# Patient Record
Sex: FEMALE | Race: WHITE | NOT HISPANIC OR LATINO | Employment: UNEMPLOYED | URBAN - METROPOLITAN AREA
[De-identification: names, ages, dates, MRNs, and addresses within clinical notes are randomized per-mention and may not be internally consistent; named-entity substitution may affect disease eponyms.]

---

## 2017-07-29 ENCOUNTER — HOSPITAL ENCOUNTER (EMERGENCY)
Facility: CLINIC | Age: 32
Discharge: HOME | End: 2017-07-29
Attending: EMERGENCY MEDICINE | Admitting: EMERGENCY MEDICINE
Payer: COMMERCIAL

## 2017-07-29 VITALS
TEMPERATURE: 98.5 F | SYSTOLIC BLOOD PRESSURE: 129 MMHG | BODY MASS INDEX: 20.8 KG/M2 | RESPIRATION RATE: 18 BRPM | HEART RATE: 75 BPM | OXYGEN SATURATION: 100 % | DIASTOLIC BLOOD PRESSURE: 83 MMHG | WEIGHT: 125 LBS

## 2017-07-29 DIAGNOSIS — J06.9 UPPER RESPIRATORY TRACT INFECTION, UNSPECIFIED TYPE: ICD-10-CM

## 2017-07-29 DIAGNOSIS — J45.909 UNCOMPLICATED ASTHMA, UNSPECIFIED ASTHMA SEVERITY: ICD-10-CM

## 2017-07-29 LAB — D DIMER PPP FEU-MCNC: 0.3 UG/ML FEU (ref 0–0.5)

## 2017-07-29 PROCEDURE — 99283 EMERGENCY DEPT VISIT LOW MDM: CPT

## 2017-07-29 PROCEDURE — 85379 FIBRIN DEGRADATION QUANT: CPT | Performed by: EMERGENCY MEDICINE

## 2017-07-29 ASSESSMENT — ENCOUNTER SYMPTOMS
SHORTNESS OF BREATH: 1
COUGH: 1

## 2017-07-29 NOTE — DISCHARGE INSTRUCTIONS
Discharge Instructions  Upper Respiratory Infection    The upper respiratory tract includes the sinuses, nasal passages, pharynx, and larynx. A URI, or upper respiratory infection, is an infection of any of the parts of the upper airway. Symptoms include runny nose, congestion, sore throat, cough, and fever. URIs are almost always caused by a virus. Antibiotics do not help with virus infections, so are not used for an ordinary URI. A URI is very contagious through coughing and nasal secretions; make sure you wash your hands often and clean surfaces after sneezing, coughing or touching them.  Viruses can live on surfaces for up to 3 days.      Return to the Emergency Department if:    Any of the symptoms you have get much worse.    You seem very sick, like being too weak to get up.    You have any new symptoms, especially serious things like chest pain.     You are short of breath.     You have a severe headache.    You are vomiting so much you can t keep fluids or medicines down.    You have confusion or seem unusually drowsy.    You have a seizure or convulsion.    Follow-up:      You should start to improve in 3 - 5 days.  A cough can linger for up to six weeks, but overall you should be feeling much better.  See your doctor if you have a fever for more than 3 days, or if you are not feeling better within 5 days.      What can I do to help myself?    Fill any prescriptions the doctor gave you and take them right away    If you have a fever, get plenty of rest and drink lots of fluids, especially water. Using a humidifier or saline nose spray will also help loosen secretions.     What clothes or blankets you have on won t change your fever. Do what is comfortable for you.    Bathing or sponging in lukewarm water may help you feel better.    Tylenol  (acetaminophen), Motrin  (ibuprofen), or Advil  (ibuprofen) help bring fever down and may help you feel more comfortable. Be sure to read and follow the package  directions, and ask your doctor if you have questions.    Do not drink alcohol.    Decongestants may help you feel better. You may use decongestant nose sprays Afrin  (oxymetazoline) or Erickson-Synephrine  (phenylephrine hydrochloride) for up to 3 days, or may use a decongestant tablet like Sudafed  (pseudoephedrine).  If you were given a prescription for medicine here today, be sure to read all of the information (including the package insert) that comes with your prescription.  This will include important information about the medicine, its side effects, and any warnings that you need to know about.  The pharmacist who fills the prescription can provide more information and answer questions you may have about the medicine.  If you have questions or concerns that the pharmacist cannot address, please call or return to the Emergency Department.   Opioid Medication Information    Pain medications are among the most commonly prescribed medicines, so we are including this information for all our patients. If you did not receive pain medication or get a prescription for pain medicine, you can ignore it.     You may have been given a prescription for an opioid (narcotic) pain medicine and/or have received a pain medicine while here in the Emergency Department. These medicines can make you drowsy or impaired. You must not drive, operate dangerous equipment, or engage in any other dangerous activities while taking these medications. If you drive while taking these medications, you could be arrested for DUI, or driving under the influence. Do not drink any alcohol while you are taking these medications.     Opioid pain medications can cause addiction. If you have a history of chemical dependency of any type, you are at a higher risk of becoming addicted to pain medications.  Only take these prescribed medications to treat your pain when all other options have been tried. Take it for as short a time and as few doses as possible.  Store your pain pills in a secure place, as they are frequently stolen and provide a dangerous opportunity for children or visitors in your house to start abusing these powerful medications. We will not replace any lost or stolen medicine.  As soon as your pain is better, you should flush all your remaining medication.     Many prescription pain medications contain Tylenol  (acetaminophen), including Vicodin , Tylenol #3 , Norco , Lortab , and Percocet .  You should not take any extra pills of Tylenol  if you are using these prescription medications or you can get very sick.  Do not ever take more than 3000 mg of acetaminophen in any 24 hour period.    All opioids tend to cause constipation. Drink plenty of water and eat foods that have a lot of fiber, such as fruits, vegetables, prune juice, apple juice and high fiber cereal.  Take a laxative if you don t move your bowels at least every other day. Miralax , Milk of Magnesia, Colace , or Senna  can be used to keep you regular.      Remember that you can always come back to the Emergency Department if you are not able to see your regular doctor in the amount of time listed above, if you get any new symptoms, or if there is anything that worries you.        Discharge Instructions  Asthma    Asthma is a condition causing narrowing and inflammation of the airways that can make it hard to breathe.  Asthma can also cause cough, wheezing, noisy breathing and tightness in the chest.  Asthma can be brought on or  triggered  by many things, including dust, mold, pollen, cigarette smoke, exercise, stress and infections, like the common cold.     Return to the Emergency Department if:    Your breathing gets worse.    You need to use your inhaler more often than every 4 hours, or can t get relief from your inhaler.    You are very weak, or feel much more ill.    You develop new symptoms, such as chest pain.    You cough up blood.    You are vomiting enough that you can t keep  fluids or your medicine down.    What can I do to help myself?    Fill any prescriptions the doctor gave you and take them right away--especially antibiotics. Be sure to finish the whole antibiotic prescription.    You may be given a prescription for an inhaler, which can help loosen tight air passages.  Use this as needed, but not more often than directed. Inhalers work much better when used with a spacer.     You may be given a prescription for a steroid to reduce inflammation. Used long-term, these can have many serious side effects, but for short courses these do not happen. You may notice restlessness or increased appetite.        You may use non-prescription cough or cold medicines. Cough medicines may help, but don t make the cough go away completely.     Avoid smoke, because this can make your symptoms worse. If you smoke, this may be a good time to quit! Consider using nicotine lozenges, gum, or patches to reduce cravings.     If you have a fever, Tylenol  (acetaminophen), Motrin  (ibuprofen), or Advil  (ibuprofen), may help bring fever down and may help you feel more comfortable. Be sure to read and follow the package directions, and ask your doctor if you have questions.    Be sure to get your flu shot each year.  The pneumonia shot can help prevent pneumonia.  It is important that you follow up with your regular doctor, to be sure that you are improving from this spell (an acute asthma exacerbation), and also to do what you can to keep from having trouble again. Sometimes you need long-term medicines to keep your asthma under control.   If you were given a prescription for medicine here today, be sure to read all of the information (including the package insert) that comes with your prescription.  This will include important information about the medicine, its side effects, and any warnings that you need to know about.  The pharmacist who fills the prescription can provide more information and answer  questions you may have about the medicine.  If you have questions or concerns that the pharmacist cannot address, please call or return to the Emergency Department.   Opioid Medication Information    Pain medications are among the most commonly prescribed medicines, so we are including this information for all our patients. If you did not receive pain medication or get a prescription for pain medicine, you can ignore it.     You may have been given a prescription for an opioid (narcotic) pain medicine and/or have received a pain medicine while here in the Emergency Department. These medicines can make you drowsy or impaired. You must not drive, operate dangerous equipment, or engage in any other dangerous activities while taking these medications. If you drive while taking these medications, you could be arrested for DUI, or driving under the influence. Do not drink any alcohol while you are taking these medications.     Opioid pain medications can cause addiction. If you have a history of chemical dependency of any type, you are at a higher risk of becoming addicted to pain medications.  Only take these prescribed medications to treat your pain when all other options have been tried. Take it for as short a time and as few doses as possible. Store your pain pills in a secure place, as they are frequently stolen and provide a dangerous opportunity for children or visitors in your house to start abusing these powerful medications. We will not replace any lost or stolen medicine.  As soon as your pain is better, you should flush all your remaining medication.     Many prescription pain medications contain Tylenol  (acetaminophen), including Vicodin , Tylenol #3 , Norco , Lortab , and Percocet .  You should not take any extra pills of Tylenol  if you are using these prescription medications or you can get very sick.  Do not ever take more than 3000 mg of acetaminophen in any 24 hour period.    All opioids tend to cause  constipation. Drink plenty of water and eat foods that have a lot of fiber, such as fruits, vegetables, prune juice, apple juice and high fiber cereal.  Take a laxative if you don t move your bowels at least every other day. Miralax , Milk of Magnesia, Colace , or Senna  can be used to keep you regular.      Remember that you can always come back to the Emergency Department if you are not able to see your regular doctor in the amount of time listed above, if you get any new symptoms, or if there is anything that worries you.

## 2017-07-29 NOTE — ED PROVIDER NOTES
History     Chief Complaint:  Cough and shortness of breath    HPI   Lorri Ansari is a 32 year old female with a history of asthma who presents from urgent care with cough and shortness of breath. The patient reports over a week ago she developed a cough which has been fairly persistent. She has been using her inhaler and started using Prednisone, 20 mg BID, on Tuesday which seemed to improve her symptoms and is currently on her 5th day. Yesterday the patient's cough returned and has been progressively worsening. She continues to use albuterol which offers brief improvement of her cough. The patient was seen at urgent care and advised to visit the emergency department for d-dimer given her risk factors noted below. The patient denies leg swelling/pain. No pleuritic component to her pain or reports of chest pain. She has had waxing and waning shortness of breath since onset of her cough. Patient received nebulizer at urgent care prior to arrival with some improvement of her symptoms.      PE/DVT Risk Factors:  The patient denies a personal history of PE, DVT, or other clotting disorders but endorses family history of stroke. The patient denies tobacco use. Currently on birth control. Patient recently had long car ride.    Allergies:  Dilaudid     Medications:    Fexofenadine hcl (allegra po)   Multivitamin, therapeutic with minerals (multi-vitamin) tabs   Hydrocodone-acetaminophen (norco) 5-325 mg per tablet   Albuterol (2.5 mg/3ml) 0.083% nebulizer solution   Fluticasone (flonase) 50 mcg/act nasal spray   Mometasone-formoterol (dulera) 200-5 mcg/act oral inhaler   Norgestrel-ethinyl estradiol (lo/ovral) 0.3-30 mg-mcg per tablet   Polyethylene glycol (miralax/glycolax) powder   Albuterol sulfate (ventolin hfa) 108 (90 base) mcg/act aers   Cetirizine (zyrtec) 10 mg tablet   Sertraline hcl 100 mg or tabs     Past Medical History:    Allergic rhinitis due to pollen   Asthma   Chronic pansinusitis   Constipation    Depression   Deviated nasal septum   Endometriosis   History of IBS       Past Surgical History:    Abdominoplasty     Septoplasty    Family History:    Depression  Thyroid disease    Social History:  .  The patient denies smoking.   The patient consumes alcohol, occasional beer.     Review of Systems   Respiratory: Positive for cough and shortness of breath.    Cardiovascular: Negative for chest pain.   All other systems reviewed and are negative.    Physical Exam     Patient Vitals for the past 24 hrs:   BP Temp Temp src Pulse Resp SpO2 Weight   17 1300 129/83 - - - 18 100 % -   17 1245 128/78 - - - 18 100 % -   17 1230 (!) 141/97 98.5  F (36.9  C) Oral 75 16 96 % 56.7 kg (125 lb)          Physical Exam  General: Resting on the gurney, appears comfortable. Frequent cough.  Head:  The scalp, face, and head appear normal  Mouth/Throat: Mucus membranes are moist  CV:  Regular rate    Normal S1 and S2  No pathological murmur   Resp:  Breath sounds clear and equal bilaterally    Non-labored, no retractions or accessory muscle use    No coarseness    No wheezing   GI:  Abdomen is soft, no rigidity    No tenderness to palpation  MS:  Normal motor assessment of all extremities.    Good capillary refill noted.  Skin:  No rash or lesions noted.  Neuro:   Speech is normal and fluent. No apparent deficit.  Psych: Awake. Alert.  Normal affect.      Appropriate interactions.      Emergency Department Course   Emergency Department Course:  Nursing notes and vitals reviewed.  I performed an exam of the patient as documented above.     The work up above was undertaken.     Findings and plan explained to the Patient. Patient discharged home with instructions regarding supportive care, medications, and reasons to return. The importance of close follow-up was reviewed.     Impression & Plan    Medical Decision Making:  Lorri Ansari is a 32 year old female who presents to the emergency department  complaining of chest pain. The patient is finishing up a steroid burst for asthma and has had ongoing cough. She went to urgent care and had a negative chest x-ray. Urgent care sent her here as they were unable to obtain d-dimer at their facility. The patient did have a recent long car ride and is on birth control and I believe screening with d-dimer is reasonable. Her d-dimer was negative. As patient had an x-ray, is already on prednisone, and already had neb today, I do not feel the patient needs further intervention in the emergency department. She is stable and in no respiratory distress. She is eager for discharge to home and I am comfortable discharging her. Follow up as an outpatient in 2-3 days. Return to the emergency department for new or worsening symptoms.    Diagnosis:    ICD-10-CM    1. Upper respiratory tract infection, unspecified type J06.9    2. Uncomplicated asthma, unspecified asthma severity J45.909        Disposition:  Discharged in good condition.    I, Chemo Lara, am serving as a scribe at 1:38 PM on 7/29/2017 to document services personally performed by Dr. Michel, based on my observations and the provider's statements to me.     EMERGENCY DEPARTMENT       Macey Michel MD  07/29/17 1549

## 2017-07-29 NOTE — ED AVS SNAPSHOT
Emergency Department    [address] [place] [id number]    Phone:  [phone]    Fax:  [phone]                                       Lorri Ansari   MRN: [id number]    Department:   Emergency Department   Date of Visit:  7/29/2017           After Visit Summary Signature Page     I have received my discharge instructions, and my questions have been answered. I have discussed any challenges I see with this plan with the nurse or doctor.    ..........................................................................................................................................  Patient/Patient Representative Signature      ..........................................................................................................................................  Patient Representative Print Name and Relationship to Patient    ..................................................               ................................................  Date                                            Time    ..........................................................................................................................................  Reviewed by Signature/Title    ...................................................              ..............................................  Date                                                            Time

## 2017-07-29 NOTE — ED AVS SNAPSHOT
Emergency Department    [address] [facility] [address]    Phone:  [phone]    Fax:  [phone]                                       Lorri Ansari   MRN: [id number]    Department:   Emergency Department   Date of Visit:  7/29/2017           Patient Information     Date Of Birth          1985        Your diagnoses for this visit were:     Upper respiratory tract infection, unspecified type     Uncomplicated asthma, unspecified asthma severity        You were seen by Macey Michel MD.      Follow-up Information     Follow up with [facility] [address] [facility]. Schedule an appointment as soon as possible for a visit in 2 days.    Contact information:    [address]  [facility] [address]          Follow up with  Emergency Department.    Specialty:  EMERGENCY MEDICINE    Why:  As needed, If symptoms worsen    Contact information:    [address] [facility] [id number] [phone]        Discharge Instructions       Discharge Instructions  Upper Respiratory Infection    The upper respiratory tract includes the sinuses, nasal passages, pharynx, and larynx. A URI, or upper respiratory infection, is an infection of any of the parts of the upper airway. Symptoms include runny nose, congestion, sore throat, cough, and fever. URIs are almost always caused by a virus. Antibiotics do not help with virus infections, so are not used for an ordinary URI. A URI is very contagious through coughing and nasal secretions; make sure you wash your hands often and clean surfaces after sneezing, coughing or touching them.  Viruses can live on surfaces for up to 3 days.      Return to the Emergency Department if:    Any of the symptoms you have get much worse.    You seem very sick, like being too weak to get up.    You have any new symptoms, especially serious things like chest pain.     You are short of breath.     You have a severe headache.    You are vomiting so much you can t  keep fluids or medicines down.    You have confusion or seem unusually drowsy.    You have a seizure or convulsion.    Follow-up:      You should start to improve in 3 - 5 days.  A cough can linger for up to six weeks, but overall you should be feeling much better.  See your doctor if you have a fever for more than 3 days, or if you are not feeling better within 5 days.      What can I do to help myself?    Fill any prescriptions the doctor gave you and take them right away    If you have a fever, get plenty of rest and drink lots of fluids, especially water. Using a humidifier or saline nose spray will also help loosen secretions.     What clothes or blankets you have on won t change your fever. Do what is comfortable for you.    Bathing or sponging in lukewarm water may help you feel better.    Tylenol  (acetaminophen), Motrin  (ibuprofen), or Advil  (ibuprofen) help bring fever down and may help you feel more comfortable. Be sure to read and follow the package directions, and ask your doctor if you have questions.    Do not drink alcohol.    Decongestants may help you feel better. You may use decongestant nose sprays Afrin  (oxymetazoline) or Erickson-Synephrine  (phenylephrine hydrochloride) for up to 3 days, or may use a decongestant tablet like Sudafed  (pseudoephedrine).  If you were given a prescription for medicine here today, be sure to read all of the information (including the package insert) that comes with your prescription.  This will include important information about the medicine, its side effects, and any warnings that you need to know about.  The pharmacist who fills the prescription can provide more information and answer questions you may have about the medicine.  If you have questions or concerns that the pharmacist cannot address, please call or return to the Emergency Department.   Opioid Medication Information    Pain medications are among the most commonly prescribed medicines, so we are  including this information for all our patients. If you did not receive pain medication or get a prescription for pain medicine, you can ignore it.     You may have been given a prescription for an opioid (narcotic) pain medicine and/or have received a pain medicine while here in the Emergency Department. These medicines can make you drowsy or impaired. You must not drive, operate dangerous equipment, or engage in any other dangerous activities while taking these medications. If you drive while taking these medications, you could be arrested for DUI, or driving under the influence. Do not drink any alcohol while you are taking these medications.     Opioid pain medications can cause addiction. If you have a history of chemical dependency of any type, you are at a higher risk of becoming addicted to pain medications.  Only take these prescribed medications to treat your pain when all other options have been tried. Take it for as short a time and as few doses as possible. Store your pain pills in a secure place, as they are frequently stolen and provide a dangerous opportunity for children or visitors in your house to start abusing these powerful medications. We will not replace any lost or stolen medicine.  As soon as your pain is better, you should flush all your remaining medication.     Many prescription pain medications contain Tylenol  (acetaminophen), including Vicodin , Tylenol #3 , Norco , Lortab , and Percocet .  You should not take any extra pills of Tylenol  if you are using these prescription medications or you can get very sick.  Do not ever take more than 3000 mg of acetaminophen in any 24 hour period.    All opioids tend to cause constipation. Drink plenty of water and eat foods that have a lot of fiber, such as fruits, vegetables, prune juice, apple juice and high fiber cereal.  Take a laxative if you don t move your bowels at least every other day. Miralax , Milk of Magnesia, Colace , or Senna  can  be used to keep you regular.      Remember that you can always come back to the Emergency Department if you are not able to see your regular doctor in the amount of time listed above, if you get any new symptoms, or if there is anything that worries you.        Discharge Instructions  Asthma    Asthma is a condition causing narrowing and inflammation of the airways that can make it hard to breathe.  Asthma can also cause cough, wheezing, noisy breathing and tightness in the chest.  Asthma can be brought on or  triggered  by many things, including dust, mold, pollen, cigarette smoke, exercise, stress and infections, like the common cold.     Return to the Emergency Department if:    Your breathing gets worse.    You need to use your inhaler more often than every 4 hours, or can t get relief from your inhaler.    You are very weak, or feel much more ill.    You develop new symptoms, such as chest pain.    You cough up blood.    You are vomiting enough that you can t keep fluids or your medicine down.    What can I do to help myself?    Fill any prescriptions the doctor gave you and take them right away--especially antibiotics. Be sure to finish the whole antibiotic prescription.    You may be given a prescription for an inhaler, which can help loosen tight air passages.  Use this as needed, but not more often than directed. Inhalers work much better when used with a spacer.     You may be given a prescription for a steroid to reduce inflammation. Used long-term, these can have many serious side effects, but for short courses these do not happen. You may notice restlessness or increased appetite.        You may use non-prescription cough or cold medicines. Cough medicines may help, but don t make the cough go away completely.     Avoid smoke, because this can make your symptoms worse. If you smoke, this may be a good time to quit! Consider using nicotine lozenges, gum, or patches to reduce cravings.     If you have a  fever, Tylenol  (acetaminophen), Motrin  (ibuprofen), or Advil  (ibuprofen), may help bring fever down and may help you feel more comfortable. Be sure to read and follow the package directions, and ask your doctor if you have questions.    Be sure to get your flu shot each year.  The pneumonia shot can help prevent pneumonia.  It is important that you follow up with your regular doctor, to be sure that you are improving from this spell (an acute asthma exacerbation), and also to do what you can to keep from having trouble again. Sometimes you need long-term medicines to keep your asthma under control.   If you were given a prescription for medicine here today, be sure to read all of the information (including the package insert) that comes with your prescription.  This will include important information about the medicine, its side effects, and any warnings that you need to know about.  The pharmacist who fills the prescription can provide more information and answer questions you may have about the medicine.  If you have questions or concerns that the pharmacist cannot address, please call or return to the Emergency Department.   Opioid Medication Information    Pain medications are among the most commonly prescribed medicines, so we are including this information for all our patients. If you did not receive pain medication or get a prescription for pain medicine, you can ignore it.     You may have been given a prescription for an opioid (narcotic) pain medicine and/or have received a pain medicine while here in the Emergency Department. These medicines can make you drowsy or impaired. You must not drive, operate dangerous equipment, or engage in any other dangerous activities while taking these medications. If you drive while taking these medications, you could be arrested for DUI, or driving under the influence. Do not drink any alcohol while you are taking these medications.     Opioid pain medications can  cause addiction. If you have a history of chemical dependency of any type, you are at a higher risk of becoming addicted to pain medications.  Only take these prescribed medications to treat your pain when all other options have been tried. Take it for as short a time and as few doses as possible. Store your pain pills in a secure place, as they are frequently stolen and provide a dangerous opportunity for children or visitors in your house to start abusing these powerful medications. We will not replace any lost or stolen medicine.  As soon as your pain is better, you should flush all your remaining medication.     Many prescription pain medications contain Tylenol  (acetaminophen), including Vicodin , Tylenol #3 , Norco , Lortab , and Percocet .  You should not take any extra pills of Tylenol  if you are using these prescription medications or you can get very sick.  Do not ever take more than 3000 mg of acetaminophen in any 24 hour period.    All opioids tend to cause constipation. Drink plenty of water and eat foods that have a lot of fiber, such as fruits, vegetables, prune juice, apple juice and high fiber cereal.  Take a laxative if you don t move your bowels at least every other day. Miralax , Milk of Magnesia, Colace , or Senna  can be used to keep you regular.      Remember that you can always come back to the Emergency Department if you are not able to see your regular doctor in the amount of time listed above, if you get any new symptoms, or if there is anything that worries you.        24 Hour Appointment Hotline       To make an appointment at any [facility], call [phone] ([phone]). If you don't have a family doctor or clinic, we will help you find one. [facility] clinics are conveniently located to serve the needs of you and your family.             Review of your medicines      Our records show that you are taking the medicines listed below. If these are incorrect, please call your  family doctor or clinic.        Dose / Directions Last dose taken    ALLEGRA PO   Dose:  180 mg        Take 180 mg by mouth daily   Refills:  0        fluticasone 50 MCG/ACT spray   Commonly known as:  FLONASE   Dose:  2 spray        Spray 2 sprays into both nostrils daily   Refills:  0        HYDROcodone-acetaminophen 5-325 MG per tablet   Commonly known as:  NORCO   Dose:  1-2 tablet   Quantity:  20 tablet        Take 1-2 tablets by mouth every 4 hours as needed for pain (Moderate to Severe Pain)   Refills:  0        mometasone-formoterol 200-5 MCG/ACT oral inhaler   Commonly known as:  DULERA   Dose:  2 puff        Inhale 2 puffs into the lungs 2 times daily   Refills:  0        Multi-vitamin Tabs tablet   Dose:  1 tablet        Take 1 tablet by mouth daily   Refills:  0        norgestrel-ethinyl estradiol 0.3-30 MG-MCG per tablet   Commonly known as:  LO/OVRAL   Dose:  1 tablet        Take 1 tablet by mouth daily   Refills:  0        polyethylene glycol powder   Commonly known as:  MIRALAX/GLYCOLAX   Dose:  17 g        Take 17 g by mouth daily   Refills:  0        sertraline 100 MG tablet   Commonly known as:  ZOLOFT   Quantity:  3 months        1 TABLET DAILY   Refills:  3        * VENTOLIN  (90 BASE) MCG/ACT Inhaler   Dose:  2 puff   Generic drug:  albuterol        Inhale 2 puffs into the lungs as needed   Refills:  0        * albuterol (2.5 MG/3ML) 0.083% neb solution   Dose:  1 vial        Take 1 vial by nebulization every 6 hours as needed for shortness of breath / dyspnea or wheezing   Refills:  0        ZYRTEC 10 MG tablet   Dose:  10 mg   Generic drug:  cetirizine        Take 10 mg by mouth daily.   Refills:  0        * Notice:  This list has 2 medication(s) that are the same as other medications prescribed for you. Read the directions carefully, and ask your doctor or other care provider to review them with you.            Procedures and tests performed during your visit     D dimer quantitative       Orders Needing Specimen Collection     None      Pending Results     No orders found from 7/27/2017 to 7/30/2017.            Pending Culture Results     No orders found from 7/27/2017 to 7/30/2017.            Pending Results Instructions     If you had any lab results that were not finalized at the time of your Discharge, you can call the ED Lab Result RN at [phone]. You will be contacted by this team for any positive Lab results or changes in treatment. The nurses are available 7 days a week from 10A to 6:30P.  You can leave a message 24 hours per day and they will return your call.        Test Results From Your Hospital Stay        7/29/2017  1:19 PM      Component Results     Component Value Ref Range & Units Status    D Dimer 0.3 0.0 - 0.50 ug/ml FEU Final    This D-dimer assay is intended for use in conjunction with a clinical pretest   probability assessment model to exclude pulmonary embolism (PE) and deep   venous   thrombosis (DVT) in outpatients suspected of PE or DVT. The cut-off value is   0.5 ug/mL FEU.                  Clinical Quality Measure: Blood Pressure Screening     Your blood pressure was checked while you were in the emergency department today. The last reading we obtained was  BP: 129/83 . Please read the guidelines below about what these numbers mean and what you should do about them.  If your systolic blood pressure (the top number) is less than 120 and your diastolic blood pressure (the bottom number) is less than 80, then your blood pressure is normal. There is nothing more that you need to do about it.  If your systolic blood pressure (the top number) is 120-139 or your diastolic blood pressure (the bottom number) is 80-89, your blood pressure may be higher than it should be. You should have your blood pressure rechecked within a year by a primary care provider.  If your systolic blood pressure (the top number) is 140 or greater or your diastolic blood pressure (the bottom  "number) is 90 or greater, you may have high blood pressure. High blood pressure is treatable, but if left untreated over time it can put you at risk for heart attack, stroke, or kidney failure. You should have your blood pressure rechecked by a primary care provider within the next 4 weeks.  If your provider in the emergency department today gave you specific instructions to follow-up with your doctor or provider even sooner than that, you should follow that instruction and not wait for up to 4 weeks for your follow-up visit.        Thank you for choosing [facility]       Thank you for choosing [facility] for your care. Our goal is always to provide you with excellent care. Hearing back from our patients is one way we can continue to improve our services. Please take a few minutes to complete the written survey that you may receive in the mail after you visit with us. Thank you!        [employer] Information     [facility] lets you send messages to your doctor, view your test results, renew your prescriptions, schedule appointments and more. To sign up, go to [email or web address] . Click on \"Log in\" on the left side of the screen, which will take you to the Welcome page. Then click on \"Sign up Now\" on the right side of the page.     You will be asked to enter the access code listed below, as well as some personal information. Please follow the directions to create your username and password.     Your access code is: [email or web address]  Expires: 10/27/2017  1:29 PM     Your access code will  in 90 days. If you need help or a new code, please call your [facility] clinic or [phone].        Care EveryWhere ID     This is your Care EveryWhere ID. This could be used by other organizations to access your [facility] medical records  [id number]        Equal Access to Services     ROCKY ADLER : lyric Acosta, izzy you. So glen " [phone].    ATENCIÓN: Si habla español, tiene a evans disposición servicios gratuitos de asistencia lingüística. Llame al [phone].    We comply with applicable federal civil rights laws and [place] laws. We do not discriminate on the basis of race, color, national origin, age, disability sex, sexual orientation or gender identity.            After Visit Summary       This is your record. Keep this with you and show to your community pharmacist(s) and doctor(s) at your next visit.

## 2021-08-27 ENCOUNTER — TELEPHONE (OUTPATIENT)
Dept: PULMONOLOGY | Facility: CLINIC | Age: 36
End: 2021-08-27

## 2021-08-27 DIAGNOSIS — J45.909 ASTHMA, UNSPECIFIED ASTHMA SEVERITY, UNSPECIFIED WHETHER COMPLICATED, UNSPECIFIED WHETHER PERSISTENT: Primary | ICD-10-CM

## 2021-08-27 NOTE — TELEPHONE ENCOUNTER
Spoke with pt about scheduling chest xray prior to visit with Dr. Hernandez on 11/17. Details confirmed.

## 2021-08-30 NOTE — TELEPHONE ENCOUNTER
RECORDS RECEIVED FROM: internal    DATE RECEIVED: 11.17.21    NOTES STATUS DETAILS   OFFICE NOTE from referring provider na Self referred    OFFICE NOTE from other specialist na    DISCHARGE SUMMARY from hospital na    DISCHARGE REPORT from the ER na    MEDICATION LIST internal     IMAGING  (NEED IMAGES AND REPORTS)     CT SCAN na    CHEST XRAY (CXR) internal  scheduled 11.17.21   TESTS     PULMONARY FUNCTION TESTING (PFT) internal  scheduled 11.17.21

## 2021-11-17 ENCOUNTER — ANCILLARY PROCEDURE (OUTPATIENT)
Dept: GENERAL RADIOLOGY | Facility: CLINIC | Age: 36
End: 2021-11-17
Attending: STUDENT IN AN ORGANIZED HEALTH CARE EDUCATION/TRAINING PROGRAM
Payer: COMMERCIAL

## 2021-11-17 ENCOUNTER — PRE VISIT (OUTPATIENT)
Dept: PULMONOLOGY | Facility: CLINIC | Age: 36
End: 2021-11-17

## 2021-11-17 ENCOUNTER — OFFICE VISIT (OUTPATIENT)
Dept: PULMONOLOGY | Facility: CLINIC | Age: 36
End: 2021-11-17
Attending: STUDENT IN AN ORGANIZED HEALTH CARE EDUCATION/TRAINING PROGRAM
Payer: COMMERCIAL

## 2021-11-17 VITALS
WEIGHT: 143.3 LBS | HEIGHT: 65 IN | HEART RATE: 83 BPM | DIASTOLIC BLOOD PRESSURE: 83 MMHG | OXYGEN SATURATION: 97 % | BODY MASS INDEX: 23.88 KG/M2 | SYSTOLIC BLOOD PRESSURE: 129 MMHG

## 2021-11-17 DIAGNOSIS — J45.30 MILD PERSISTENT ASTHMA WITHOUT COMPLICATION: ICD-10-CM

## 2021-11-17 DIAGNOSIS — J45.909 ASTHMA, UNSPECIFIED ASTHMA SEVERITY, UNSPECIFIED WHETHER COMPLICATED, UNSPECIFIED WHETHER PERSISTENT: ICD-10-CM

## 2021-11-17 PROCEDURE — 94375 RESPIRATORY FLOW VOLUME LOOP: CPT | Performed by: INTERNAL MEDICINE

## 2021-11-17 PROCEDURE — 94729 DIFFUSING CAPACITY: CPT | Performed by: INTERNAL MEDICINE

## 2021-11-17 PROCEDURE — 71046 X-RAY EXAM CHEST 2 VIEWS: CPT | Mod: GC | Performed by: RADIOLOGY

## 2021-11-17 PROCEDURE — G0463 HOSPITAL OUTPT CLINIC VISIT: HCPCS | Mod: 25

## 2021-11-17 PROCEDURE — 99204 OFFICE O/P NEW MOD 45 MIN: CPT | Mod: 25 | Performed by: STUDENT IN AN ORGANIZED HEALTH CARE EDUCATION/TRAINING PROGRAM

## 2021-11-17 PROCEDURE — 94726 PLETHYSMOGRAPHY LUNG VOLUMES: CPT | Performed by: INTERNAL MEDICINE

## 2021-11-17 RX ORDER — ESCITALOPRAM OXALATE 5 MG/1
10 TABLET ORAL DAILY
COMMUNITY

## 2021-11-17 RX ORDER — DESOGESTREL AND ETHINYL ESTRADIOL 21-5 (28)
1 KIT ORAL DAILY
COMMUNITY

## 2021-11-17 RX ORDER — BUDESONIDE AND FORMOTEROL FUMARATE DIHYDRATE 160; 4.5 UG/1; UG/1
2 AEROSOL RESPIRATORY (INHALATION) 2 TIMES DAILY
COMMUNITY
End: 2021-12-08

## 2021-11-17 ASSESSMENT — PAIN SCALES - GENERAL: PAINLEVEL: NO PAIN (0)

## 2021-11-17 ASSESSMENT — MIFFLIN-ST. JEOR: SCORE: 1335.88

## 2021-11-17 NOTE — PROGRESS NOTES
[facility]   Pulmonary   Clinic Consult Note    Reason for Visit  New Patient (New asthma )      Assessment and plan:  Mild persistent asthma   Chronic sinusitis   Upper airway cough syndrome   Chronic allergies    Patients asthma is relatively well controlled and seems to be primarily triggered by her sinusitis and allergies (currently allergic to dog) and she does have a dog in the house. PFTs are supranormal with a clear CHEST X-RAY     -step down therapy    -symbicort 1 puff bid    -will try PRN after that if that goes well, would need to change to low dose 80mcg symbicort if this is done         RHM: COVID vaccination status mrna x3, influenza, up to date.    RTC: PRN        Patient seen and or discussed with Dr Frazier who agrees with assessment and plan detailed above        ==============================================================  HPI  Lorri Ansari is a 36 year old year old female who is being seen for New Patient (New asthma )    Asthma diagnosed after high school has been on controller since. 10 yrs prior made some diet changes which helped sinsusitis and asthma, dairy and gluten free helped. Allergic to dog and doing shots, previously + dust, mold, dog dander.     Uses symbicort BID, with 2-4 puffs of albuterol/per week. Seems to be more related to her sinus health. No night time symptoms, does not have wheeze. Not limmited with activity. Is on a daily OCP and does not have a period and no change in the past with menstruation. No relation with NSAIDS or aspirin. Denies any voice change during episodes.    Pulmonary specific exposures and social history:  Works as a . No smoking or additional respiratory exposures. No relevant family history.       Current Outpatient Medications   Medication     albuterol (2.5 MG/3ML) 0.083% nebulizer solution     Albuterol Sulfate (VENTOLIN HFA) 108 (90 BASE) MCG/ACT AERS     budesonide-formoterol (SYMBICORT) 160-4.5 MCG/ACT Inhaler      cetirizine (ZYRTEC) 10 MG tablet     desogestrel-ethinyl estradiol (KARIVA) 0.15-0.02/0.01 MG () tablet     escitalopram (LEXAPRO) 5 MG tablet     multivitamin, therapeutic with minerals (MULTI-VITAMIN) TABS     Fexofenadine HCl (ALLEGRA PO)     fluticasone (FLONASE) 50 MCG/ACT nasal spray     HYDROcodone-acetaminophen (NORCO) 5-325 MG per tablet     mometasone-formoterol (DULERA) 200-5 MCG/ACT oral inhaler     norgestrel-ethinyl estradiol (LO/OVRAL) 0.3-30 MG-MCG per tablet     polyethylene glycol (MIRALAX/GLYCOLAX) powder     SERTRALINE  MG OR TABS     No current facility-administered medications for this visit.     Past Medical History:   Diagnosis Date     Allergic rhinitis due to pollen 1996     Asthma      Chronic pansinusitis      Constipation      Depression      Deviated nasal septum     CHRONIC SINUSITIS     Endometriosis      History of IBS      Allergies   Allergen Reactions     Dilaudid [Hydromorphone Hcl] Itching     Past Surgical History:   Procedure Laterality Date     ABDOMEN SURGERY      ABDOMINOPLASTY     CLOSED RX DIST RAD/ULNA FX,MANIPUL  age 8     ENDOSCOPIC BALLOON SINUPLASTY ACCLARENT Bilateral 2015    Procedure: ENDOSCOPIC BALLOON SINUPLASTY ACCLARENT;  Surgeon: Lennox Neri MD;  Location: [facility]     GYN SURGERY      , LAPAROSCOPY, TUBAL LIGATION, COLPOSCOPY     HCL PAP SMEAR  2009    WNL/ eli     SEPTOPLASTY, ENDOSCOPIC SINUS SURGERY, COMBINED  2011    Procedure:COMBINED SEPTOPLASTY, ENDOSCOPIC SINUS SURGERY; BILATERAL ETHMOIDECTOMY AND MAXILLARY ANTROSTOMIES (ENDOSCOPIC), SEPTOPLASTY; Surgeon:MARISABEL BRAVO; Location:[facility]     Social History     Socioeconomic History     Marital status:      Spouse name: Not on file     Number of children: 0     Years of education: Not on file     Highest education level: Not on file   Occupational History     Not on file   Tobacco Use     Smoking status: Never Smoker     Smokeless tobacco: Never Used  "  Substance and Sexual Activity     Alcohol use: Yes     Alcohol/week: 0.8 standard drinks     Types: 1 Standard drinks or equivalent per week     Comment: Occasional-beer     Drug use: No     Sexual activity: Yes     Partners: Male     Birth control/protection: Condom, Pill   Other Topics Concern     Parent/sibling w/ CABG, MI or angioplasty before 65F 55M? Not Asked   Social History Narrative     Not on file     Social Determinants of Health     Financial Resource Strain: Not on file   Food Insecurity: Not on file   Transportation Needs: Not on file   Physical Activity: Not on file   Stress: Not on file   Social Connections: Not on file   Intimate Partner Violence: Not on file   Housing Stability: Not on file       Family History   Problem Relation Age of Onset     Diabetes No family hx of      Depression Maternal Grandfather      Thyroid Disease Mother         hypothyroid     Thyroid Disease Maternal Grandmother         hypothyroid     Thyroid Disease Paternal Grandmother         hypothyroid       ROS Pulmonary    A complete ROS was otherwise negative except as noted in the HPI.      Vitals:    11/17/21 1459   BP: 129/83   BP Location: Right arm   Patient Position: Chair   Cuff Size: Adult Regular   Pulse: 83   SpO2: 97%   Weight: 65 kg (143 lb 4.8 oz)   Height: 1.651 m (5' 5\")     Exam:   GENERAL APPEARANCE: Well developed, well nourished, alert, and in no apparent distress.  EYES: PERRL, EOMI  MOUTH: Oral mucosa is moist, without any lesions  NECK: supple, no masses, no thyromegaly.    RESP: good  air flow throughout, - no crackles, rhonchi or wheezes.   CV: Normal S1, S2, regular rhythm, normal rate, no LE edema.   ABDOMEN:  Bowel sounds normal,   MS: extremities normal-  no clubbing, no cyanosis.  SKIN: no rash on limited exam  NEURO: Mentation intact, speech normal, normal strength and tone, normal gait and stance  PSYCH: mentation appears normal. and affect normal/bright    Results: I have reviewed all " imaging, PFTs and other relavent tests, please see below for details, PFT and imaging results were reviewed with the patient.      PFT  PFT Latest Ref Rng & Units 11/17/2021   FVC L 5.04   FEV1 L 4.30   FVC% % 130   FEV1% % 134         Additional data:

## 2021-11-17 NOTE — NURSING NOTE
Chief Complaint   Patient presents with     New Patient     New asthma      Vitals were taken and medications were reconciled.     Amy Spear RMA  3:06 PM

## 2021-11-17 NOTE — LETTER
11/17/2021     RE: Lorri Ansari  [address] [address]    Dear Colleague,    Thank you for referring your patient, Lorri Ansari, to the [facility] [place]. Please see a copy of my visit note below.    [facility]   Pulmonary   Clinic Consult Note    Reason for Visit  New Patient (New asthma )      Assessment and plan:  Mild persistent asthma   Chronic sinusitis   Upper airway cough syndrome   Chronic allergies    Patients asthma is relatively well controlled and seems to be primarily triggered by her sinusitis and allergies (currently allergic to dog) and she does have a dog in the house. PFTs are supranormal with a clear CHEST X-RAY     -step down therapy    -symbicort 1 puff bid    -will try PRN after that if that goes well, would need to change to low dose 80mcg symbicort if this is done         RHM: COVID vaccination status mrna x3, influenza, up to date.    RTC: PRN        Patient seen and or discussed with Dr Frazier who agrees with assessment and plan detailed above        ==============================================================  HPI  Lorri Ansari is a 36 year old year old female who is being seen for New Patient (New asthma )    Asthma diagnosed after high school has been on controller since. 10 yrs prior made some diet changes which helped sinsusitis and asthma, dairy and gluten free helped. Allergic to dog and doing shots, previously + dust, mold, dog dander.     Uses symbicort BID, with 2-4 puffs of albuterol/per week. Seems to be more related to her sinus health. No night time symptoms, does not have wheeze. Not limmited with activity. Is on a daily OCP and does not have a period and no change in the past with menstruation. No relation with NSAIDS or aspirin. Denies any voice change during episodes.    Pulmonary specific exposures and social history:  Works as a . No smoking or  additional respiratory exposures. No relevant family history.       Current Outpatient Medications   Medication     albuterol (2.5 MG/3ML) 0.083% nebulizer solution     Albuterol Sulfate (VENTOLIN HFA) 108 (90 BASE) MCG/ACT AERS     budesonide-formoterol (SYMBICORT) 160-4.5 MCG/ACT Inhaler     cetirizine (ZYRTEC) 10 MG tablet     desogestrel-ethinyl estradiol (KARIVA) 0.15-0.02/0.01 MG () tablet     escitalopram (LEXAPRO) 5 MG tablet     multivitamin, therapeutic with minerals (MULTI-VITAMIN) TABS     Fexofenadine HCl (ALLEGRA PO)     fluticasone (FLONASE) 50 MCG/ACT nasal spray     HYDROcodone-acetaminophen (NORCO) 5-325 MG per tablet     mometasone-formoterol (DULERA) 200-5 MCG/ACT oral inhaler     norgestrel-ethinyl estradiol (LO/OVRAL) 0.3-30 MG-MCG per tablet     polyethylene glycol (MIRALAX/GLYCOLAX) powder     SERTRALINE  MG OR TABS     No current facility-administered medications for this visit.     Past Medical History:   Diagnosis Date     Allergic rhinitis due to pollen      Asthma      Chronic pansinusitis      Constipation      Depression      Deviated nasal septum     CHRONIC SINUSITIS     Endometriosis      History of IBS      Allergies   Allergen Reactions     Dilaudid [Hydromorphone Hcl] Itching     Past Surgical History:   Procedure Laterality Date     ABDOMEN SURGERY      ABDOMINOPLASTY     CLOSED RX DIST RAD/ULNA FX,MANIPUL  age 8     ENDOSCOPIC BALLOON SINUPLASTY ACCLARENT Bilateral 2015    Procedure: ENDOSCOPIC BALLOON SINUPLASTY ACCLARENT;  Surgeon: Lennox Neri MD;  Location: [facility]     GYN SURGERY      , LAPAROSCOPY, TUBAL LIGATION, COLPOSCOPY     HCL PAP SMEAR  2009    WNL/ eli     SEPTOPLASTY, ENDOSCOPIC SINUS SURGERY, COMBINED  2011    Procedure:COMBINED SEPTOPLASTY, ENDOSCOPIC SINUS SURGERY; BILATERAL ETHMOIDECTOMY AND MAXILLARY ANTROSTOMIES (ENDOSCOPIC), SEPTOPLASTY; Surgeon:MARISABEL BRAVO; Location:[facility]     Social History     Socioeconomic  "History     Marital status:      Spouse name: Not on file     Number of children: 0     Years of education: Not on file     Highest education level: Not on file   Occupational History     Not on file   Tobacco Use     Smoking status: Never Smoker     Smokeless tobacco: Never Used   Substance and Sexual Activity     Alcohol use: Yes     Alcohol/week: 0.8 standard drinks     Types: 1 Standard drinks or equivalent per week     Comment: Occasional-beer     Drug use: No     Sexual activity: Yes     Partners: Male     Birth control/protection: Condom, Pill   Other Topics Concern     Parent/sibling w/ CABG, MI or angioplasty before 65F 55M? Not Asked   Social History Narrative     Not on file     Social Determinants of Health     Financial Resource Strain: Not on file   Food Insecurity: Not on file   Transportation Needs: Not on file   Physical Activity: Not on file   Stress: Not on file   Social Connections: Not on file   Intimate Partner Violence: Not on file   Housing Stability: Not on file       Family History   Problem Relation Age of Onset     Diabetes No family hx of      Depression Maternal Grandfather      Thyroid Disease Mother         hypothyroid     Thyroid Disease Maternal Grandmother         hypothyroid     Thyroid Disease Paternal Grandmother         hypothyroid       ROS Pulmonary    A complete ROS was otherwise negative except as noted in the HPI.      Vitals:    11/17/21 1459   BP: 129/83   BP Location: Right arm   Patient Position: Chair   Cuff Size: Adult Regular   Pulse: 83   SpO2: 97%   Weight: 65 kg (143 lb 4.8 oz)   Height: 1.651 m (5' 5\")     Exam:   GENERAL APPEARANCE: Well developed, well nourished, alert, and in no apparent distress.  EYES: PERRL, EOMI  MOUTH: Oral mucosa is moist, without any lesions  NECK: supple, no masses, no thyromegaly.    RESP: good  air flow throughout, - no crackles, rhonchi or wheezes.   CV: Normal S1, S2, regular rhythm, normal rate, no LE edema.   ABDOMEN:  " Bowel sounds normal,   MS: extremities normal-  no clubbing, no cyanosis.  SKIN: no rash on limited exam  NEURO: Mentation intact, speech normal, normal strength and tone, normal gait and stance  PSYCH: mentation appears normal. and affect normal/bright    Results: I have reviewed all imaging, PFTs and other relavent tests, please see below for details, PFT and imaging results were reviewed with the patient.      PFT  PFT Latest Ref Rng & Units 11/17/2021   FVC L 5.04   FEV1 L 4.30   FVC% % 130   FEV1% % 134         Additional data:  Attestation signed by Klarissa Frazier MD at 11/23/2021  4:17 PM:  Physician Attestation   I, Klarissa Frazier MD, saw this patient and agree with the findings and plan of care as documented in the note.      Items personally reviewed/procedural attestation: vitals, imaging and agree with the interpretation documented in the note, and spirometry report and agree with the interpretation documented in the note.    Again, thank you for allowing me to participate in the care of your patient.      Sincerely,    Nick Hernandez MD

## 2021-11-17 NOTE — PATIENT INSTRUCTIONS
Thank you for visiting us in the Pulmonary clinic today.      We discussed:     Can try 1 puff 2x daily of symbicort    Can maybe even try as needed (can do 12 puffs of symbicort per day)     as needed    Nick Hernandez MD   Pulmonary Fellow     Please call pulmonary clinic if you have concerns or questions.    #[phone]

## 2021-11-18 LAB
DLCOUNC-%PRED-PRE: 125 %
DLCOUNC-PRE: 28.71 ML/MIN/MMHG
DLCOUNC-PRED: 22.84 ML/MIN/MMHG
ERV-%PRED-PRE: 148 %
ERV-PRE: 1.75 L
ERV-PRED: 1.18 L
EXPTIME-PRE: 6.61 SEC
FEF2575-%PRED-PRE: 146 %
FEF2575-PRE: 4.96 L/SEC
FEF2575-PRED: 3.38 L/SEC
FEFMAX-%PRED-PRE: 130 %
FEFMAX-PRE: 9.35 L/SEC
FEFMAX-PRED: 7.16 L/SEC
FEV1-%PRED-PRE: 134 %
FEV1-PRE: 4.3 L
FEV1FEV6-PRE: 85 %
FEV1FEV6-PRED: 84 %
FEV1FVC-PRE: 85 %
FEV1FVC-PRED: 83 %
FEV1SVC-PRE: 84 %
FEV1SVC-PRED: 82 %
FIFMAX-PRE: 7.55 L/SEC
FRCPLETH-%PRED-PRE: 110 %
FRCPLETH-PRE: 3.02 L
FRCPLETH-PRED: 2.74 L
FVC-%PRED-PRE: 130 %
FVC-PRE: 5.04 L
FVC-PRED: 3.87 L
IC-%PRED-PRE: 124 %
IC-PRE: 3.36 L
IC-PRED: 2.7 L
RVPLETH-%PRED-PRE: 80 %
RVPLETH-PRE: 1.27 L
RVPLETH-PRED: 1.58 L
TLCPLETH-%PRED-PRE: 124 %
TLCPLETH-PRE: 6.38 L
TLCPLETH-PRED: 5.11 L
VA-%PRED-PRE: 125 %
VA-PRE: 6.46 L
VC-%PRED-PRE: 131 %
VC-PRE: 5.1 L
VC-PRED: 3.88 L

## 2021-11-23 PROBLEM — J45.30 MILD PERSISTENT ASTHMA WITHOUT COMPLICATION: Status: ACTIVE | Noted: 2021-11-23

## 2021-12-08 DIAGNOSIS — J45.909 ASTHMA, UNSPECIFIED ASTHMA SEVERITY, UNSPECIFIED WHETHER COMPLICATED, UNSPECIFIED WHETHER PERSISTENT: Primary | ICD-10-CM

## 2021-12-08 RX ORDER — BUDESONIDE AND FORMOTEROL FUMARATE DIHYDRATE 160; 4.5 UG/1; UG/1
2 AEROSOL RESPIRATORY (INHALATION) 2 TIMES DAILY
Qty: 6 G | Refills: 11 | Status: SHIPPED | OUTPATIENT
Start: 2021-12-08

## 2021-12-18 ENCOUNTER — HEALTH MAINTENANCE LETTER (OUTPATIENT)
Age: 36
End: 2021-12-18

## 2022-02-01 ENCOUNTER — LAB REQUISITION (OUTPATIENT)
Dept: LAB | Facility: CLINIC | Age: 37
End: 2022-02-01
Payer: COMMERCIAL

## 2022-02-01 DIAGNOSIS — J32.9 CHRONIC SINUSITIS, UNSPECIFIED: ICD-10-CM

## 2022-02-01 PROCEDURE — 87077 CULTURE AEROBIC IDENTIFY: CPT | Mod: ORL,59 | Performed by: OTOLARYNGOLOGY

## 2022-02-04 LAB
BACTERIA SINUS CULT: ABNORMAL
BACTERIA SINUS CULT: ABNORMAL

## 2022-02-18 ENCOUNTER — APPOINTMENT (OUTPATIENT)
Dept: CT IMAGING | Facility: CLINIC | Age: 37
End: 2022-02-18
Attending: PHYSICIAN ASSISTANT
Payer: COMMERCIAL

## 2022-02-18 ENCOUNTER — HOSPITAL ENCOUNTER (EMERGENCY)
Facility: CLINIC | Age: 37
Discharge: HOME | End: 2022-02-18
Attending: PHYSICIAN ASSISTANT | Admitting: PHYSICIAN ASSISTANT
Payer: COMMERCIAL

## 2022-02-18 VITALS
TEMPERATURE: 98.1 F | HEART RATE: 97 BPM | OXYGEN SATURATION: 98 % | DIASTOLIC BLOOD PRESSURE: 81 MMHG | SYSTOLIC BLOOD PRESSURE: 120 MMHG | RESPIRATION RATE: 18 BRPM

## 2022-02-18 DIAGNOSIS — D72.819 LEUKOPENIA, UNSPECIFIED TYPE: ICD-10-CM

## 2022-02-18 DIAGNOSIS — U07.1 INFECTION DUE TO 2019 NOVEL CORONAVIRUS: ICD-10-CM

## 2022-02-18 DIAGNOSIS — E83.51 HYPOCALCEMIA: ICD-10-CM

## 2022-02-18 LAB
ANION GAP SERPL CALCULATED.3IONS-SCNC: 7 MMOL/L (ref 3–14)
ATRIAL RATE - MUSE: 87 BPM
BASOPHILS # BLD AUTO: 0 10E3/UL (ref 0–0.2)
BASOPHILS NFR BLD AUTO: 1 %
BUN SERPL-MCNC: 8 MG/DL (ref 7–30)
CALCIUM SERPL-MCNC: 8.3 MG/DL (ref 8.5–10.1)
CHLORIDE BLD-SCNC: 105 MMOL/L (ref 94–109)
CO2 SERPL-SCNC: 25 MMOL/L (ref 20–32)
CREAT SERPL-MCNC: 0.71 MG/DL (ref 0.52–1.04)
D DIMER PPP FEU-MCNC: 0.61 UG/ML FEU (ref 0–0.5)
DIASTOLIC BLOOD PRESSURE - MUSE: NORMAL MMHG
EOSINOPHIL # BLD AUTO: 0.1 10E3/UL (ref 0–0.7)
EOSINOPHIL NFR BLD AUTO: 2 %
ERYTHROCYTE [DISTWIDTH] IN BLOOD BY AUTOMATED COUNT: 12.2 % (ref 10–15)
GFR SERPL CREATININE-BSD FRML MDRD: >90 ML/MIN/1.73M2
GLUCOSE BLD-MCNC: 101 MG/DL (ref 70–99)
HCT VFR BLD AUTO: 38.3 % (ref 35–47)
HGB BLD-MCNC: 12.9 G/DL (ref 11.7–15.7)
HOLD SPECIMEN: NORMAL
IMM GRANULOCYTES # BLD: 0 10E3/UL
IMM GRANULOCYTES NFR BLD: 0 %
INTERPRETATION ECG - MUSE: NORMAL
LYMPHOCYTES # BLD AUTO: 0.9 10E3/UL (ref 0.8–5.3)
LYMPHOCYTES NFR BLD AUTO: 25 %
MCH RBC QN AUTO: 32.2 PG (ref 26.5–33)
MCHC RBC AUTO-ENTMCNC: 33.7 G/DL (ref 31.5–36.5)
MCV RBC AUTO: 96 FL (ref 78–100)
MONOCYTES # BLD AUTO: 0.4 10E3/UL (ref 0–1.3)
MONOCYTES NFR BLD AUTO: 13 %
NEUTROPHILS # BLD AUTO: 2.1 10E3/UL (ref 1.6–8.3)
NEUTROPHILS NFR BLD AUTO: 59 %
NRBC # BLD AUTO: 0 10E3/UL
NRBC BLD AUTO-RTO: 0 /100
P AXIS - MUSE: 79 DEGREES
PLATELET # BLD AUTO: 181 10E3/UL (ref 150–450)
POTASSIUM BLD-SCNC: 3.4 MMOL/L (ref 3.4–5.3)
PR INTERVAL - MUSE: 158 MS
QRS DURATION - MUSE: 78 MS
QT - MUSE: 362 MS
QTC - MUSE: 435 MS
R AXIS - MUSE: 45 DEGREES
RBC # BLD AUTO: 4.01 10E6/UL (ref 3.8–5.2)
SODIUM SERPL-SCNC: 137 MMOL/L (ref 133–144)
SYSTOLIC BLOOD PRESSURE - MUSE: NORMAL MMHG
T AXIS - MUSE: 36 DEGREES
TROPONIN I SERPL HS-MCNC: <3 NG/L
VENTRICULAR RATE- MUSE: 87 BPM
WBC # BLD AUTO: 3.4 10E3/UL (ref 4–11)

## 2022-02-18 PROCEDURE — 85379 FIBRIN DEGRADATION QUANT: CPT | Performed by: PHYSICIAN ASSISTANT

## 2022-02-18 PROCEDURE — 99285 EMERGENCY DEPT VISIT HI MDM: CPT | Mod: 25

## 2022-02-18 PROCEDURE — 80048 BASIC METABOLIC PNL TOTAL CA: CPT | Performed by: PHYSICIAN ASSISTANT

## 2022-02-18 PROCEDURE — 71275 CT ANGIOGRAPHY CHEST: CPT

## 2022-02-18 PROCEDURE — 93005 ELECTROCARDIOGRAM TRACING: CPT

## 2022-02-18 PROCEDURE — 84484 ASSAY OF TROPONIN QUANT: CPT | Performed by: PHYSICIAN ASSISTANT

## 2022-02-18 PROCEDURE — 36415 COLL VENOUS BLD VENIPUNCTURE: CPT | Performed by: PHYSICIAN ASSISTANT

## 2022-02-18 PROCEDURE — 250N000011 HC RX IP 250 OP 636: Performed by: PHYSICIAN ASSISTANT

## 2022-02-18 PROCEDURE — 85025 COMPLETE CBC W/AUTO DIFF WBC: CPT | Performed by: PHYSICIAN ASSISTANT

## 2022-02-18 PROCEDURE — 250N000009 HC RX 250: Performed by: PHYSICIAN ASSISTANT

## 2022-02-18 RX ORDER — IOPAMIDOL 755 MG/ML
58 INJECTION, SOLUTION INTRAVASCULAR ONCE
Status: COMPLETED | OUTPATIENT
Start: 2022-02-18 | End: 2022-02-18

## 2022-02-18 RX ORDER — PREDNISONE 20 MG/1
TABLET ORAL
Qty: 10 TABLET | Refills: 0 | Status: SHIPPED | OUTPATIENT
Start: 2022-02-18 | End: 2022-10-10

## 2022-02-18 RX ADMIN — IOPAMIDOL 58 ML: 755 INJECTION, SOLUTION INTRAVENOUS at 16:20

## 2022-02-18 RX ADMIN — SODIUM CHLORIDE 85 ML: 9 INJECTION, SOLUTION INTRAVENOUS at 16:20

## 2022-02-18 ASSESSMENT — ENCOUNTER SYMPTOMS
DIARRHEA: 0
ABDOMINAL PAIN: 0
CHEST TIGHTNESS: 1
HEADACHES: 1
NAUSEA: 0
CHILLS: 0
SORE THROAT: 0
SHORTNESS OF BREATH: 1
FEVER: 0
VOMITING: 0
COUGH: 1

## 2022-02-18 NOTE — ED PROVIDER NOTES
History     Chief Complaint:  Covid Concern       HPI   Lorri Ansari is a 36 year old female with a hx of asthma and birth control use, presents to the emergency room today for evaluation of shortness of breath and chest pain in the setting of COVID-19.  On Wednesday, she noted that she started coughing, and tested positive for Covid today.  She feels that while resting, her heart rate is much more elevated than normal.  She says that she has a tightness in her chest, congestion, and feeling rundown.  She denies any hemoptysis, swelling in her legs or calves, history of DVT or PE, recent surgeries, travel, history of cancer.  She just traveled to [place] for a trip and is fully vaccinated.  She has been using an albuterol inhaler at home.    ROS:  Review of Systems   Constitutional: Negative for chills and fever.   HENT: Positive for congestion. Negative for sore throat.    Respiratory: Positive for cough, chest tightness and shortness of breath.    Cardiovascular: Positive for chest pain.   Gastrointestinal: Negative for abdominal pain, diarrhea, nausea and vomiting.   Neurological: Positive for headaches.      All other systems reviewed and are negative.    Allergies:  Dilaudid [Hydromorphone Hcl]     Medications:    predniSONE (DELTASONE) 20 MG tablet  albuterol (2.5 MG/3ML) 0.083% nebulizer solution  Albuterol Sulfate (VENTOLIN HFA) 108 (90 BASE) MCG/ACT AERS  budesonide-formoterol (SYMBICORT) 160-4.5 MCG/ACT Inhaler  cetirizine (ZYRTEC) 10 MG tablet  desogestrel-ethinyl estradiol (KARIVA) 0.15-0.02/0.01 MG (21/5) tablet  escitalopram (LEXAPRO) 5 MG tablet  Fexofenadine HCl (ALLEGRA PO)  fluticasone (FLONASE) 50 MCG/ACT nasal spray  HYDROcodone-acetaminophen (NORCO) 5-325 MG per tablet  mometasone-formoterol (DULERA) 200-5 MCG/ACT oral inhaler  multivitamin, therapeutic with minerals (MULTI-VITAMIN) TABS  norgestrel-ethinyl estradiol (LO/OVRAL) 0.3-30 MG-MCG per tablet  polyethylene glycol  (MIRALAX/GLYCOLAX) powder  SERTRALINE  MG OR TABS        Past Medical History:    Past Medical History:   Diagnosis Date     Allergic rhinitis due to pollen      Asthma      Chronic pansinusitis      Constipation      Depression      Deviated nasal septum      Endometriosis      History of IBS      Patient Active Problem List   Diagnosis     Allergic rhinitis due to pollen     Depressive disorder, not elsewhere classified     Constipation     Molluscum contagiosum     Mild persistent asthma without complication        Past Surgical History:    Past Surgical History:   Procedure Laterality Date     ABDOMEN SURGERY      ABDOMINOPLASTY     CLOSED RX DIST RAD/ULNA FX,MANIPUL  age 8     ENDOSCOPIC BALLOON SINUPLASTY ACCLARENT Bilateral 2015    Procedure: ENDOSCOPIC BALLOON SINUPLASTY ACCLARENT;  Surgeon: Lennox Neri MD;  Location: [facility]     GYN SURGERY      , LAPAROSCOPY, TUBAL LIGATION, COLPOSCOPY     HCL PAP SMEAR  2009    WNL/ eli     SEPTOPLASTY, ENDOSCOPIC SINUS SURGERY, COMBINED  2011    Procedure:COMBINED SEPTOPLASTY, ENDOSCOPIC SINUS SURGERY; BILATERAL ETHMOIDECTOMY AND MAXILLARY ANTROSTOMIES (ENDOSCOPIC), SEPTOPLASTY; Surgeon:MARISABEL BRAVO; Location:[facility]        Family History:    family history includes Depression in her maternal grandfather; Thyroid Disease in her maternal grandmother, mother, and paternal grandmother.    Social History:   reports that she has never smoked. She has never used smokeless tobacco. She reports current alcohol use of about 0.8 standard drinks of alcohol per week. She reports that she does not use drugs.  PCP: [facility] [address]     Physical Exam     Patient Vitals for the past 24 hrs:   BP Temp Temp src Pulse Resp SpO2   22 1700 120/81 -- -- -- 18 98 %   22 1545 122/85 -- -- -- 18 97 %   22 1445 (!) 151/84 98.1  F (36.7  C) Oral 97 20 98 %        Physical Exam  General: Well appearing, pleasant female, resting  on exam bed  HEENT: No evidence of trauma.  Conjunctive are clear.  Extraocular eye movements intact.  Neck range of motion intact.  Nose and throat clear.  Respiratory: Good breath sounds bilaterally  Cardiovascular: Normal rate and rhythm   Gastrointestinal: Soft, nontender.   Musculoskeletal: Atraumatic  Skin: Exposed skin clear.  Neurologic: Alert.  Psych:  Patient is cooperative, with normal affect.      Emergency Department Course   ECG  ECG obtained at 1538, ECG read at 1544  Normal sinus rhythm  Possible Left atrial enlargement  Borderline ECG  No prior ECG for comparison.  Rate 87 bpm. WV interval 158 ms. QRS duration 78 ms. QT/QTc 362/435 ms. P-R-T axes 79 45 36.     Imaging:  CT Chest Pulmonary Embolism w Contrast   Final Result   IMPRESSION:   1.  No pulmonary embolism.   2.  Lungs are currently clear.                 Laboratory:  Labs Ordered and Resulted from Time of ED Arrival to Time of ED Departure   D DIMER QUANTITATIVE - Abnormal       Result Value    D-Dimer Quantitative 0.61 (*)    BASIC METABOLIC PANEL - Abnormal    Sodium 137      Potassium 3.4      Chloride 105      Carbon Dioxide (CO2) 25      Anion Gap 7      Urea Nitrogen 8      Creatinine 0.71      Calcium 8.3 (*)     Glucose 101 (*)     GFR Estimate >90     CBC WITH PLATELETS AND DIFFERENTIAL - Abnormal    WBC Count 3.4 (*)     RBC Count 4.01      Hemoglobin 12.9      Hematocrit 38.3      MCV 96      MCH 32.2      MCHC 33.7      RDW 12.2      Platelet Count 181      % Neutrophils 59      % Lymphocytes 25      % Monocytes 13      % Eosinophils 2      % Basophils 1      % Immature Granulocytes 0      NRBCs per 100 WBC 0      Absolute Neutrophils 2.1      Absolute Lymphocytes 0.9      Absolute Monocytes 0.4      Absolute Eosinophils 0.1      Absolute Basophils 0.0      Absolute Immature Granulocytes 0.0      Absolute NRBCs 0.0     TROPONIN I - Normal    Troponin I High Sensitivity <3          Interventions:  Medications   iopamidol  (ISOVUE-370) solution 58 mL (58 mLs Intravenous Given 2/18/22 1620)   Saline Flush (85 mLs Intravenous Given 2/18/22 1620)        Impression & Plan      Medical Decision Making:  Lorri Ansari is a 36 year old female with a hx of birth control use, presents emergency room today for evaluation of shortness of breath in the setting of COVID-19.  See HPI.  Her vitals are unremarkable.  She has a reassuring exam and is in no acute distress.  EKG unremarkable.  White count is low, expected in COVID-19.  Communicated the patient.  She has mild hypokalemia.  Troponin negative.  Dimer was elevated so she went for a PE run that was unremarkable thankfully.  She remains clinically and vitally stable for about 3 hours.  She does not score on the MASSBP to qualify for COVID-19 therapy.  She is boosted and will likely do just fine.  She is referred to the get [facility] loop program.  She has asthma so we gave her a prednisone burst should she decide to take it.  She just recovered from another virus and had prednisone at that time and does not want to take too much prednisone.  She has nebulizers at home that she can use.  She is to return with new or worsening symptoms and follow-up as needed.    Diagnosis:    ICD-10-CM    1. Infection due to 2019 novel coronavirus  U07.1 COVID-19 [facility] Loop Referral     Care Coordination Referral   2. Leukopenia, unspecified type  D72.819    3. Hypocalcemia  E83.51         Discharge Medications:  Discharge Medication List as of 2/18/2022  5:33 PM      START taking these medications    Details   predniSONE (DELTASONE) 20 MG tablet Take two tablets (= 40mg) each day for 5 (five) days, Disp-10 tablet, R-0, Local Print              2/18/2022   Cristhian Bliss PA-C Cyr, Matthew R, PA-C  02/18/22 [id number]

## 2022-02-19 ENCOUNTER — PATIENT OUTREACH (OUTPATIENT)
Dept: CARE COORDINATION | Facility: CLINIC | Age: 37
End: 2022-02-19
Payer: COMMERCIAL

## 2022-02-19 NOTE — TELEPHONE ENCOUNTER
Clinic Care Coordination Contact  [facility]: Post-Discharge Note  SITUATION                                                      Admission: 2-          Discharge: 2-       BACKGROUND                                                      Per hospital discharge summary and inpatient provider notes:  Lorri Ansari is a 36 year old female with a hx of asthma and birth control use, presents to the emergency room today for evaluation of shortness of breath and chest pain in the setting of COVID-19.  On Wednesday, she noted that she started coughing, and tested positive for Covid today.  She feels that while resting, her heart rate is much more elevated than normal.  She says that she has a tightness in her chest, congestion, and feeling rundown.  She denies any hemoptysis, swelling in her legs or calves, history of DVT or PE, recent surgeries, travel, history of cancer.  She just traveled to [place] for a trip and is fully vaccinated.  She has been using an albuterol inhaler at home.       ASSESSMENT         As above                     PLAN                                                      Outpatient Plan:  Patient will follow up with PCP if not feeling better but states that she is doing much better today and is using her nebulizer treatment as recommended.     No future appointments.      For any urgent concerns, please contact our 24 hour nurse triage line: [phone] ([phone])         Christianne Pereira RN

## 2022-09-14 ENCOUNTER — MEDICAL CORRESPONDENCE (OUTPATIENT)
Dept: HEALTH INFORMATION MANAGEMENT | Facility: CLINIC | Age: 37
End: 2022-09-14

## 2022-09-28 ENCOUNTER — TRANSFERRED RECORDS (OUTPATIENT)
Dept: HEALTH INFORMATION MANAGEMENT | Facility: CLINIC | Age: 37
End: 2022-09-28

## 2022-10-09 ENCOUNTER — HEALTH MAINTENANCE LETTER (OUTPATIENT)
Age: 37
End: 2022-10-09

## 2022-10-10 ENCOUNTER — TELEPHONE (OUTPATIENT)
Dept: SURGERY | Facility: CLINIC | Age: 37
End: 2022-10-10

## 2022-10-10 ENCOUNTER — OFFICE VISIT (OUTPATIENT)
Dept: SURGERY | Facility: CLINIC | Age: 37
End: 2022-10-10
Payer: COMMERCIAL

## 2022-10-10 VITALS
HEIGHT: 65 IN | DIASTOLIC BLOOD PRESSURE: 70 MMHG | HEART RATE: 83 BPM | BODY MASS INDEX: 23.32 KG/M2 | WEIGHT: 140 LBS | SYSTOLIC BLOOD PRESSURE: 120 MMHG

## 2022-10-10 DIAGNOSIS — K81.9 CHOLECYSTITIS: Primary | ICD-10-CM

## 2022-10-10 PROCEDURE — 99243 OFF/OP CNSLTJ NEW/EST LOW 30: CPT | Performed by: SURGERY

## 2022-10-10 NOTE — LETTER
October 12, 2022          Zoey Amaya PA-C  [address] [facility] [address]  [place] [address]      RE:   Lorri Ansari 1985      Dear Colleague,    Thank you for referring your patient, Lorri Ansari, to [facility]. Please see a copy of my visit note below.    We have been asked to see Mrs. Ansari as she has developed increasing frequency of right upper quadrant abdominal pain and has been found to have cholelithiasis on ultrasound.  Some of the recent episodes have been postprandial.  Ultrasound was reviewed.  The rationale for laparoscopic cholecystectomy was explained.  She understands the risks, benefits, hopeful outcome, possible complications of this procedure.  She will schedule in the near future.     If questions please call us.    Again, thank you for allowing me to participate in the care of your patient.      Sincerely,      Darell De Oliveira MD

## 2022-10-10 NOTE — TELEPHONE ENCOUNTER
Type of surgery: Lap daniel  Location of surgery: [facility]  Date and time of surgery: 11/7/22 at 1:30pm  Surgeon: Dr. Darell De Oliveira  Pre-Op Appt Date: patient to schedule  Post-Op Appt Date: patient to schedule   Packet sent out: Yes  Pre-cert/Authorization completed:  Not Applicable  Date: 10/10/22

## 2022-10-12 NOTE — PROGRESS NOTES
General surgery clinic note    We have been asked to see Mrs. Ansari as she has developed increasing frequency of right upper quadrant abdominal pain and has been found to have cholelithiasis on ultrasound.  Some of the recent episodes have been postprandial.  Ultrasound was reviewed.  The rationale for laparoscopic cholecystectomy was explained.  She understands the risks, benefits, hopeful outcome, possible complications of this procedure.  She will schedule in the near future.    If questions please call us.    Total encounter time 40 minutes.  More than half spent in counseling, review of data and explanation of care.

## 2022-10-13 ENCOUNTER — MYC MEDICAL ADVICE (OUTPATIENT)
Dept: SURGERY | Facility: CLINIC | Age: 37
End: 2022-10-13

## 2022-10-27 ENCOUNTER — TRANSFERRED RECORDS (OUTPATIENT)
Dept: HEALTH INFORMATION MANAGEMENT | Facility: CLINIC | Age: 37
End: 2022-10-27

## 2022-10-28 LAB
CREATININE (EXTERNAL): 0.68 MG/DL (ref 0.57–1)
GFR ESTIMATED (EXTERNAL): 114 ML/MIN/1.73
GLUCOSE (EXTERNAL): 89 MG/DL (ref 70–99)
POTASSIUM (EXTERNAL): 3.7 MMOL/L (ref 3.5–5.2)

## 2022-11-07 ENCOUNTER — ANESTHESIA (OUTPATIENT)
Dept: SURGERY | Facility: CLINIC | Age: 37
End: 2022-11-07
Payer: COMMERCIAL

## 2022-11-07 ENCOUNTER — ANESTHESIA EVENT (OUTPATIENT)
Dept: SURGERY | Facility: CLINIC | Age: 37
End: 2022-11-07
Payer: COMMERCIAL

## 2022-11-07 ENCOUNTER — HOSPITAL ENCOUNTER (OUTPATIENT)
Facility: CLINIC | Age: 37
Discharge: HOME | End: 2022-11-07
Attending: SURGERY | Admitting: SURGERY
Payer: COMMERCIAL

## 2022-11-07 ENCOUNTER — APPOINTMENT (OUTPATIENT)
Dept: SURGERY | Facility: PHYSICIAN GROUP | Age: 37
End: 2022-11-07
Payer: COMMERCIAL

## 2022-11-07 VITALS
HEIGHT: 65 IN | DIASTOLIC BLOOD PRESSURE: 62 MMHG | WEIGHT: 139.2 LBS | BODY MASS INDEX: 23.19 KG/M2 | HEART RATE: 66 BPM | TEMPERATURE: 98 F | RESPIRATION RATE: 16 BRPM | OXYGEN SATURATION: 100 % | SYSTOLIC BLOOD PRESSURE: 112 MMHG

## 2022-11-07 DIAGNOSIS — G89.18 POSTOPERATIVE PAIN: Primary | ICD-10-CM

## 2022-11-07 LAB — HCG UR QL: NEGATIVE

## 2022-11-07 PROCEDURE — 250N000011 HC RX IP 250 OP 636: Performed by: ANESTHESIOLOGY

## 2022-11-07 PROCEDURE — 88304 TISSUE EXAM BY PATHOLOGIST: CPT | Mod: TC | Performed by: SURGERY

## 2022-11-07 PROCEDURE — 250N000013 HC RX MED GY IP 250 OP 250 PS 637: Performed by: PHYSICIAN ASSISTANT

## 2022-11-07 PROCEDURE — 250N000011 HC RX IP 250 OP 636

## 2022-11-07 PROCEDURE — 250N000025 HC SEVOFLURANE, PER MIN: Performed by: SURGERY

## 2022-11-07 PROCEDURE — 258N000003 HC RX IP 258 OP 636: Performed by: ANESTHESIOLOGY

## 2022-11-07 PROCEDURE — 360N000076 HC SURGERY LEVEL 3, PER MIN: Performed by: SURGERY

## 2022-11-07 PROCEDURE — 999N000141 HC STATISTIC PRE-PROCEDURE NURSING ASSESSMENT: Performed by: SURGERY

## 2022-11-07 PROCEDURE — 710N000012 HC RECOVERY PHASE 2, PER MINUTE: Performed by: SURGERY

## 2022-11-07 PROCEDURE — 250N000009 HC RX 250: Performed by: SURGERY

## 2022-11-07 PROCEDURE — 710N000009 HC RECOVERY PHASE 1, LEVEL 1, PER MIN: Performed by: SURGERY

## 2022-11-07 PROCEDURE — 250N000009 HC RX 250

## 2022-11-07 PROCEDURE — 81025 URINE PREGNANCY TEST: CPT | Performed by: ANESTHESIOLOGY

## 2022-11-07 PROCEDURE — 88304 TISSUE EXAM BY PATHOLOGIST: CPT | Mod: 26 | Performed by: PATHOLOGY

## 2022-11-07 PROCEDURE — 47562 LAPAROSCOPIC CHOLECYSTECTOMY: CPT | Performed by: SURGERY

## 2022-11-07 PROCEDURE — 272N000001 HC OR GENERAL SUPPLY STERILE: Performed by: SURGERY

## 2022-11-07 PROCEDURE — 370N000017 HC ANESTHESIA TECHNICAL FEE, PER MIN: Performed by: SURGERY

## 2022-11-07 PROCEDURE — 250N000011 HC RX IP 250 OP 636: Performed by: SURGERY

## 2022-11-07 PROCEDURE — 47562 LAPAROSCOPIC CHOLECYSTECTOMY: CPT | Mod: AS | Performed by: PHYSICIAN ASSISTANT

## 2022-11-07 RX ORDER — CEFAZOLIN SODIUM/WATER 2 G/20 ML
2 SYRINGE (ML) INTRAVENOUS
Status: COMPLETED | OUTPATIENT
Start: 2022-11-07 | End: 2022-11-07

## 2022-11-07 RX ORDER — AMOXICILLIN 250 MG
1-2 CAPSULE ORAL 2 TIMES DAILY
Qty: 30 TABLET | Refills: 0 | Status: SHIPPED | OUTPATIENT
Start: 2022-11-07

## 2022-11-07 RX ORDER — GLYCOPYRROLATE 0.2 MG/ML
INJECTION, SOLUTION INTRAMUSCULAR; INTRAVENOUS PRN
Status: DISCONTINUED | OUTPATIENT
Start: 2022-11-07 | End: 2022-11-07

## 2022-11-07 RX ORDER — LIDOCAINE 40 MG/G
CREAM TOPICAL
Status: DISCONTINUED | OUTPATIENT
Start: 2022-11-07 | End: 2022-11-07 | Stop reason: HOSPADM

## 2022-11-07 RX ORDER — DIMENHYDRINATE 50 MG/ML
25 INJECTION, SOLUTION INTRAMUSCULAR; INTRAVENOUS
Status: DISCONTINUED | OUTPATIENT
Start: 2022-11-07 | End: 2022-11-07 | Stop reason: HOSPADM

## 2022-11-07 RX ORDER — ACETAMINOPHEN 325 MG/1
975 TABLET ORAL ONCE
Status: DISCONTINUED | OUTPATIENT
Start: 2022-11-07 | End: 2022-11-07 | Stop reason: HOSPADM

## 2022-11-07 RX ORDER — PROPOFOL 10 MG/ML
INJECTION, EMULSION INTRAVENOUS PRN
Status: DISCONTINUED | OUTPATIENT
Start: 2022-11-07 | End: 2022-11-07

## 2022-11-07 RX ORDER — FENTANYL CITRATE 0.05 MG/ML
50 INJECTION, SOLUTION INTRAMUSCULAR; INTRAVENOUS EVERY 5 MIN PRN
Status: DISCONTINUED | OUTPATIENT
Start: 2022-11-07 | End: 2022-11-07 | Stop reason: HOSPADM

## 2022-11-07 RX ORDER — HYDROXYZINE HYDROCHLORIDE 25 MG/1
25 TABLET, FILM COATED ORAL EVERY 6 HOURS PRN
Status: DISCONTINUED | OUTPATIENT
Start: 2022-11-07 | End: 2022-11-07 | Stop reason: HOSPADM

## 2022-11-07 RX ORDER — CEFAZOLIN SODIUM/WATER 2 G/20 ML
2 SYRINGE (ML) INTRAVENOUS SEE ADMIN INSTRUCTIONS
Status: DISCONTINUED | OUTPATIENT
Start: 2022-11-07 | End: 2022-11-07 | Stop reason: HOSPADM

## 2022-11-07 RX ORDER — NEOSTIGMINE METHYLSULFATE 1 MG/ML
VIAL (ML) INJECTION PRN
Status: DISCONTINUED | OUTPATIENT
Start: 2022-11-07 | End: 2022-11-07

## 2022-11-07 RX ORDER — HYDRALAZINE HYDROCHLORIDE 20 MG/ML
2.5-5 INJECTION INTRAMUSCULAR; INTRAVENOUS EVERY 10 MIN PRN
Status: DISCONTINUED | OUTPATIENT
Start: 2022-11-07 | End: 2022-11-07 | Stop reason: HOSPADM

## 2022-11-07 RX ORDER — OXYCODONE HYDROCHLORIDE 5 MG/1
5 TABLET ORAL EVERY 4 HOURS PRN
Status: DISCONTINUED | OUTPATIENT
Start: 2022-11-07 | End: 2022-11-07 | Stop reason: HOSPADM

## 2022-11-07 RX ORDER — DEXAMETHASONE SODIUM PHOSPHATE 4 MG/ML
INJECTION, SOLUTION INTRA-ARTICULAR; INTRALESIONAL; INTRAMUSCULAR; INTRAVENOUS; SOFT TISSUE PRN
Status: DISCONTINUED | OUTPATIENT
Start: 2022-11-07 | End: 2022-11-07

## 2022-11-07 RX ORDER — KETOROLAC TROMETHAMINE 30 MG/ML
INJECTION, SOLUTION INTRAMUSCULAR; INTRAVENOUS PRN
Status: DISCONTINUED | OUTPATIENT
Start: 2022-11-07 | End: 2022-11-07

## 2022-11-07 RX ORDER — SODIUM CHLORIDE, SODIUM LACTATE, POTASSIUM CHLORIDE, CALCIUM CHLORIDE 600; 310; 30; 20 MG/100ML; MG/100ML; MG/100ML; MG/100ML
INJECTION, SOLUTION INTRAVENOUS CONTINUOUS
Status: DISCONTINUED | OUTPATIENT
Start: 2022-11-07 | End: 2022-11-07 | Stop reason: HOSPADM

## 2022-11-07 RX ORDER — ONDANSETRON 4 MG/1
4-8 TABLET, ORALLY DISINTEGRATING ORAL EVERY 6 HOURS PRN
Qty: 10 TABLET | Refills: 0 | Status: SHIPPED | OUTPATIENT
Start: 2022-11-07

## 2022-11-07 RX ORDER — HYDROMORPHONE HCL IN WATER/PF 6 MG/30 ML
0.2 PATIENT CONTROLLED ANALGESIA SYRINGE INTRAVENOUS EVERY 5 MIN PRN
Status: DISCONTINUED | OUTPATIENT
Start: 2022-11-07 | End: 2022-11-07 | Stop reason: HOSPADM

## 2022-11-07 RX ORDER — FENTANYL CITRATE 50 UG/ML
INJECTION, SOLUTION INTRAMUSCULAR; INTRAVENOUS PRN
Status: DISCONTINUED | OUTPATIENT
Start: 2022-11-07 | End: 2022-11-07

## 2022-11-07 RX ORDER — ONDANSETRON 4 MG/1
4 TABLET, ORALLY DISINTEGRATING ORAL EVERY 30 MIN PRN
Status: DISCONTINUED | OUTPATIENT
Start: 2022-11-07 | End: 2022-11-07 | Stop reason: HOSPADM

## 2022-11-07 RX ORDER — LIDOCAINE HYDROCHLORIDE 20 MG/ML
INJECTION, SOLUTION INFILTRATION; PERINEURAL PRN
Status: DISCONTINUED | OUTPATIENT
Start: 2022-11-07 | End: 2022-11-07

## 2022-11-07 RX ORDER — ACETAMINOPHEN 325 MG/1
650 TABLET ORAL
Status: DISCONTINUED | OUTPATIENT
Start: 2022-11-07 | End: 2022-11-07 | Stop reason: HOSPADM

## 2022-11-07 RX ORDER — ONDANSETRON 2 MG/ML
INJECTION INTRAMUSCULAR; INTRAVENOUS PRN
Status: DISCONTINUED | OUTPATIENT
Start: 2022-11-07 | End: 2022-11-07

## 2022-11-07 RX ORDER — ONDANSETRON 2 MG/ML
4 INJECTION INTRAMUSCULAR; INTRAVENOUS EVERY 30 MIN PRN
Status: DISCONTINUED | OUTPATIENT
Start: 2022-11-07 | End: 2022-11-07 | Stop reason: HOSPADM

## 2022-11-07 RX ORDER — DEXMEDETOMIDINE HYDROCHLORIDE 4 UG/ML
INJECTION, SOLUTION INTRAVENOUS PRN
Status: DISCONTINUED | OUTPATIENT
Start: 2022-11-07 | End: 2022-11-07

## 2022-11-07 RX ORDER — LABETALOL HYDROCHLORIDE 5 MG/ML
10 INJECTION, SOLUTION INTRAVENOUS
Status: DISCONTINUED | OUTPATIENT
Start: 2022-11-07 | End: 2022-11-07 | Stop reason: HOSPADM

## 2022-11-07 RX ORDER — TRAMADOL HYDROCHLORIDE 50 MG/1
50 TABLET ORAL EVERY 6 HOURS PRN
Qty: 10 TABLET | Refills: 0 | Status: SHIPPED | OUTPATIENT
Start: 2022-11-07 | End: 2022-11-10

## 2022-11-07 RX ORDER — BUPIVACAINE HYDROCHLORIDE AND EPINEPHRINE 5; 5 MG/ML; UG/ML
INJECTION, SOLUTION EPIDURAL; INTRACAUDAL; PERINEURAL PRN
Status: DISCONTINUED | OUTPATIENT
Start: 2022-11-07 | End: 2022-11-07 | Stop reason: HOSPADM

## 2022-11-07 RX ORDER — PROPOFOL 10 MG/ML
INJECTION, EMULSION INTRAVENOUS CONTINUOUS PRN
Status: DISCONTINUED | OUTPATIENT
Start: 2022-11-07 | End: 2022-11-07

## 2022-11-07 RX ADMIN — ROCURONIUM BROMIDE 30 MG: 50 INJECTION, SOLUTION INTRAVENOUS at 13:39

## 2022-11-07 RX ADMIN — ONDANSETRON 4 MG: 2 INJECTION INTRAMUSCULAR; INTRAVENOUS at 14:17

## 2022-11-07 RX ADMIN — DEXMEDETOMIDINE HYDROCHLORIDE 12 MCG: 200 INJECTION INTRAVENOUS at 13:52

## 2022-11-07 RX ADMIN — PROPOFOL 200 MG: 10 INJECTION, EMULSION INTRAVENOUS at 13:39

## 2022-11-07 RX ADMIN — ONDANSETRON 4 MG: 2 INJECTION INTRAMUSCULAR; INTRAVENOUS at 15:01

## 2022-11-07 RX ADMIN — PROPOFOL 50 MCG/KG/MIN: 10 INJECTION, EMULSION INTRAVENOUS at 13:44

## 2022-11-07 RX ADMIN — MIDAZOLAM 2 MG: 1 INJECTION INTRAMUSCULAR; INTRAVENOUS at 13:37

## 2022-11-07 RX ADMIN — SODIUM CHLORIDE, POTASSIUM CHLORIDE, SODIUM LACTATE AND CALCIUM CHLORIDE: 600; 310; 30; 20 INJECTION, SOLUTION INTRAVENOUS at 12:09

## 2022-11-07 RX ADMIN — NEOSTIGMINE METHYLSULFATE 3 MG: 1 INJECTION, SOLUTION INTRAVENOUS at 14:22

## 2022-11-07 RX ADMIN — ACETAMINOPHEN 650 MG: 325 TABLET, FILM COATED ORAL at 15:31

## 2022-11-07 RX ADMIN — KETOROLAC TROMETHAMINE 30 MG: 30 INJECTION, SOLUTION INTRAMUSCULAR at 14:22

## 2022-11-07 RX ADMIN — DEXMEDETOMIDINE HYDROCHLORIDE 8 MCG: 200 INJECTION INTRAVENOUS at 13:46

## 2022-11-07 RX ADMIN — DEXAMETHASONE SODIUM PHOSPHATE 4 MG: 4 INJECTION, SOLUTION INTRA-ARTICULAR; INTRALESIONAL; INTRAMUSCULAR; INTRAVENOUS; SOFT TISSUE at 13:50

## 2022-11-07 RX ADMIN — GLYCOPYRROLATE 0.4 MG: 0.2 INJECTION, SOLUTION INTRAMUSCULAR; INTRAVENOUS at 14:22

## 2022-11-07 RX ADMIN — FENTANYL CITRATE 50 MCG: 50 INJECTION, SOLUTION INTRAMUSCULAR; INTRAVENOUS at 13:58

## 2022-11-07 RX ADMIN — Medication 2 G: at 13:37

## 2022-11-07 RX ADMIN — LIDOCAINE HYDROCHLORIDE 50 MG: 20 INJECTION, SOLUTION INFILTRATION; PERINEURAL at 13:39

## 2022-11-07 RX ADMIN — FENTANYL CITRATE 50 MCG: 50 INJECTION, SOLUTION INTRAMUSCULAR; INTRAVENOUS at 15:01

## 2022-11-07 ASSESSMENT — ACTIVITIES OF DAILY LIVING (ADL)
ADLS_ACUITY_SCORE: 35
ADLS_ACUITY_SCORE: 35

## 2022-11-07 ASSESSMENT — LIFESTYLE VARIABLES: TOBACCO_USE: 0

## 2022-11-07 NOTE — ANESTHESIA PREPROCEDURE EVALUATION
Anesthesia Pre-Procedure Evaluation    Patient: Lorri Ansari   MRN: [id number] : 1985        Procedure : Procedure(s):  LAPAROSCOPIC CHOLECYSTECTOMY          Past Medical History:   Diagnosis Date     Allergic rhinitis due to pollen      Asthma      Chronic pansinusitis      Constipation      Depression      Deviated nasal septum     CHRONIC SINUSITIS     Endometriosis      History of IBS       Past Surgical History:   Procedure Laterality Date     ABDOMEN SURGERY      ABDOMINOPLASTY     CLOSED RX DIST RAD/ULNA FX,MANIPUL  age 8     ENDOSCOPIC BALLOON SINUPLASTY ACCLARENT Bilateral 2015    Procedure: ENDOSCOPIC BALLOON SINUPLASTY ACCLARENT;  Surgeon: Lennox Neri MD;  Location: [facility]     GYN SURGERY      , LAPAROSCOPY, TUBAL LIGATION, COLPOSCOPY     HCL PAP SMEAR  2009    WNL/ eli     SEPTOPLASTY, ENDOSCOPIC SINUS SURGERY, COMBINED  2011    Procedure:COMBINED SEPTOPLASTY, ENDOSCOPIC SINUS SURGERY; BILATERAL ETHMOIDECTOMY AND MAXILLARY ANTROSTOMIES (ENDOSCOPIC), SEPTOPLASTY; Surgeon:MARISABEL BRAVO; Location:[facility]      Allergies   Allergen Reactions     Dilaudid [Hydromorphone Hcl] Itching     Norco [Hydrocodone-Acetaminophen] Nausea and Vomiting     Most opioids cause nausea/vomiting     Percocet [Oxycodone-Acetaminophen] Nausea and Vomiting      Social History     Tobacco Use     Smoking status: Never     Smokeless tobacco: Never   Substance Use Topics     Alcohol use: Yes     Alcohol/week: 0.8 standard drinks     Types: 1 Standard drinks or equivalent per week     Comment: Occasional      Wt Readings from Last 1 Encounters:   22 63.1 kg (139 lb 3.2 oz)        Anesthesia Evaluation   Pt has had prior anesthetic. Type: General.    No history of anesthetic complications       ROS/MED HX  ENT/Pulmonary:     (+) allergic rhinitis, asthma  (-) tobacco use   Neurologic:       Cardiovascular:     (+) -----Previous cardiac testing   Echo: Date: Results:    Stress Test: Date:  Results:    ECG Reviewed: Date: 2/18/22 Results:  NSR  Cath: Date: Results:      METS/Exercise Tolerance:     Hematologic:       Musculoskeletal:       GI/Hepatic: Comment: IBS    (+) cholecystitis/cholelithiasis,     Renal/Genitourinary:       Endo:       Psychiatric/Substance Use:     (+) psychiatric history depression     Infectious Disease:       Malignancy:       Other:     (-) Any chance pregnant       Physical Exam    Airway        Mallampati: I   TM distance: > 3 FB   Neck ROM: full   Mouth opening: > 3 cm    Respiratory Devices and Support         Dental  no notable dental history         Cardiovascular          Rhythm and rate: regular and normal     Pulmonary           breath sounds clear to auscultation           OUTSIDE LABS:  CBC:   Lab Results   Component Value Date    WBC 3.4 (L) 02/18/2022    WBC 11.8 (H) 02/18/2009    HGB 12.9 02/18/2022    HGB 10.2 (L) 05/06/2011    HCT 38.3 02/18/2022    HCT 35.9 02/18/2009     02/18/2022     02/18/2009     BMP:   Lab Results   Component Value Date     02/18/2022     02/18/2009    POTASSIUM 3.4 02/18/2022    POTASSIUM 3.7 02/18/2009    CHLORIDE 105 02/18/2022    CHLORIDE 102 02/18/2009    CO2 25 02/18/2022    CO2 30 02/18/2009    BUN 8 02/18/2022    BUN 14 02/18/2009    CR 0.71 02/18/2022    CR 0.73 02/18/2009     (H) 02/18/2022     (H) 02/18/2009     COAGS: No results found for: PTT, INR, FIBR  POC:   Lab Results   Component Value Date    HCG Negative 05/19/2015     HEPATIC:   Lab Results   Component Value Date    ALBUMIN 4.3 02/18/2009    PROTTOTAL 7.4 02/18/2009    ALT 18 02/18/2009    AST 20 02/18/2009    ALKPHOS 51 02/18/2009    BILITOTAL 0.4 02/18/2009    BILIDIRECT 0.1 09/06/2008     OTHER:   Lab Results   Component Value Date    PH 5.0 05/06/2009    UNBIA 8.3 (L) 02/18/2022    LIPASE 75 02/18/2009    TSH 2.48 09/06/2008    T4 6.9 03/13/2001    T4 2.5 03/13/2001       Anesthesia Plan    ASA Status:  2   NPO Status:   NPO Appropriate    Anesthesia Type: General.     - Airway: ETT   Induction: Intravenous.   Maintenance: Balanced.        Consents    Anesthesia Plan(s) and associated risks, benefits, and realistic alternatives discussed. Questions answered and patient/representative(s) expressed understanding.    - Discussed:     - Discussed with:  Patient         Postoperative Care    Pain management: IV analgesics, Oral pain medications, Multi-modal analgesia.   PONV prophylaxis: Ondansetron (or other 5HT-3), Dexamethasone or Solumedrol, Background Propofol Infusion     Comments:    Other Comments: Precedex pushes, try to minimize/avoid narcotics            Wilman Briseno MD

## 2022-11-07 NOTE — OR NURSING
Patient brought a picture of home covid antigen test on phone as directed.  I personally saw this result and it was time stamped 11/6/2022 at 1009.  Result was negative.

## 2022-11-07 NOTE — BRIEF OP NOTE
[facility]    Brief Operative Note    Pre-operative diagnosis: Cholecystitis [K81.9]  Post-operative diagnosis Same as pre-operative diagnosis    Procedure: Procedure(s):  LAPAROSCOPIC CHOLECYSTECTOMY  Surgeon: Surgeon(s) and Role:     * Darell De Oliveira MD - Primary     * Jazzy Enriquez PA-C - Assisting  Anesthesia: General   Estimated Blood Loss: 5 mL from 11/7/2022  1:36 PM to 11/7/2022  2:32 PM      Drains: None  Specimens:   ID Type Source Tests Collected by Time Destination   1 : GALLBLADDER AND CONTENTS Tissue Gallbladder SURGICAL PATHOLOGY EXAM Darell De Oliveira MD 11/7/2022  2:11 PM      Findings:   Anatomy as expected, no complications.  Complications: None.  Implants: * No implants in log *

## 2022-11-07 NOTE — ANESTHESIA PROCEDURE NOTES
Airway       Patient location during procedure: OR       Procedure Start/Stop Times: 11/7/2022 1:42 PM  Staff -        Anesthesiologist:  Wilman Briseno MD       CRNA: Sita Santos APRN CRNA       Other Anesthesia Staff: Carson Ziegler       Performed By: SRNA  Consent for Airway        Urgency: elective  Indications and Patient Condition       Indications for airway management: kp-procedural       Induction type:intravenous       Mask difficulty assessment: 1 - vent by mask    Final Airway Details       Final airway type: endotracheal airway       Successful airway: ETT - single  Endotracheal Airway Details        ETT size (mm): 7.0       Cuffed: yes       Successful intubation technique: direct laryngoscopy       DL Blade Type: MAC 3       Grade View of Cords: 1       Adjucts: stylet       Position: Right       Measured from: gums/teeth       Secured at (cm): 22       Bite block used: None    Post intubation assessment        Placement verified by: capnometry, equal breath sounds and chest rise        Number of attempts at approach: 1       Secured with: silk tape       Ease of procedure: easy       Dentition: Intact and Unchanged    Medication(s) Administered   Medication Administration Time: 11/7/2022 1:42 PM

## 2022-11-07 NOTE — OR NURSING
Up to BR voids cl light yellow [id number] sats >92% RA- Good PO intake, pain tolerable 3/10- Discharge instructions by phone w Mom-Pt and responsible adult verbalize understanding of discharge instructions, denies questions. Up in W/C - transported to door for discharge to home.

## 2022-11-07 NOTE — DISCHARGE INSTRUCTIONS
Today you received Toradol, an antiinflammatory medication similar to Ibuprofen.  You should not take other antiinflammatory medication, such as Ibuprofen, Motrin, Advil, Aleve, Naprosyn, etc until 8:20 p.m.    Today you were given 650 mg of Tylenol at 3:30pm on 11/7/22. The recommended daily maximum dose is 4000 mg.        [facility]  Discharge Instructions: Post-Operative Laparoscopic Cholecystectomy    ACTIVITY  Expect to feel tired after your surgery.  This will gradually resolve.    Take frequent, short walks and increase your activity gradually.    Avoid strenuous physical activity or heavy lifting greater than 15-20 lbs. for 2-3 weeks.  You may climb stairs.  You may drive without restrictions when you are not using any prescription pain medication and feel comfortable in a car.  You may return to work/school when you are comfortable without any prescription pain medication.    WOUND CARE  You may remove your outer dressing or Band-Aids and shower 48 hours after the surgery.  Pat your incisions dry and leave them open to air.  Re-apply dressing (Band-Aids or gauze/tape) as needed for comfort or drainage.  You may have steri-strips (looks like white tape) on your incision.  You may peel off the steri-strips 2 weeks after your surgery if they have not peeled off on their own.   Do not soak your incisions in a tub or pool for 2 weeks.   Do not apply any lotions, creams, or ointments to your incisions.  A ridge under your incisions is normal and will gradually resolve.    DIET  Start with liquids, then gradually resume your regular diet as tolerated.  Avoid heavy, spicy, and greasy meals for 2-3 days.  Drink plenty of fluids to stay hydrated.  It is not uncommon to experience some loose stools or diarrhea after surgery.  This is your body's way of adapting to the bile which will slowly drain into your intestine.  A low fat diet may help with this.  This should improve over 1-2  months.    PAIN  Expect some tenderness and discomfort at the incision sites.  Use the prescribed pain medication at your discretion.  Expect gradual resolution of your pain over several days.  You may take ibuprofen with food (unless you have been told not to) or acetaminophen/Tylenol instead of or in addition to your prescribed pain medication.  You may take Tylenol 500 - 1000 mg every 6 hours as needed for pain - do not exceed 4,000 mg of tylenol (acetaminophen) from all sources in 24 hours.  You may take Ibuprofen 400 - 600 mg every 6 hours as needed for pain - do not exceed 2,400 mg of ibuprofen from all sources in 24 hours. Do not use Ibuprofen for any longer than necessary or take if you have been told not to by another medical provider.  You may alternate Ibuprofen and Tylenol every 3 hours as needed for pain. Reserve the narcotic prescription for refractory pain.  Do not drink alcohol or drive while you are taking pain medications.  You may apply ice to your incisions in 20 minute intervals as needed for the next 48 hours.  After that time, consider switching to heat if you prefer.    EXPECTATIONS  Pain medications can cause constipation.  Limit use when possible.  Take over the counter stool softener/stimulant, such as Colace or Senna, 1-2 times a day with plenty of water.  You may take a mild over the counter laxative, such as Miralax or a suppository, as needed.  You may discontinue these medications once you are having regular bowel movements and/or are no longer taking your narcotic pain medication.    You may have shoulder or upper back discomfort due to the gas used in surgery.  This is temporary and should resolve in 48-72 hours.  Short, frequent walks may help with this.  If you are unable to urinate, or feel as though you are not emptying your bladder adequately, we recommend you call our office and/or seek care at an ER or Urgent Care facility if after hours.    FOLLOW UP  Our office will contact  you in approximately 2 weeks to check on your progress and answer any questions you may have.  If you are doing well, you will not need to return for a follow up appointment.  If any concerns are identified over the phone, we will help you make an appointment to see a provider.   If you have not received a phone call, have any questions or concerns, or would like to be seen, please call us at [phone] and ask to speak with our nurse.  We are located at [address] [place] [address].    CALL OUR OFFICE -[phone] IF YOU HAVE:   Chills or fever above 101 F.  Increased redness, warmth, or drainage at your incisions.  Significant bleeding.  Pain not relieved by your pain medication or rest.  Increasing pain after the first 48 hours.  Any other concerns or questions.                          **If you have concerns or questions about your procedure,    please contact Dr. De Oliveira at  [phone]**    Same Day Surgery Discharge Instructions for  Sedation and General Anesthesia     It's not unusual to feel dizzy, light-headed or faint for up to 24 hours after surgery or while taking pain medication.  If you have these symptoms: sit for a few minutes before standing and have someone assist you when you get up to walk or use the bathroom.    You should rest and relax for the next 24 hours. We recommend you make arrangements to have an adult stay with you for at least 24 hours after your discharge.  Avoid hazardous and strenuous activity.    DO NOT DRIVE any vehicle or operate mechanical equipment for 24 hours following the end of your surgery.  Even though you may feel normal, your reactions may be affected by the medication you have received.    Do not drink alcoholic beverages for 24 hours following surgery.     Slowly progress to your regular diet as you feel able. It's not unusual to feel nauseated and/or vomit after receiving anesthesia.  If you develop these symptoms, drink clear liquids  (apple juice, ginger ale, broth, 7-up, etc. ) until you feel better.  If your nausea and vomiting persists for 24 hours, please notify your surgeon.      All narcotic pain medications, along with inactivity and anesthesia, can cause constipation. Drinking plenty of liquids and increasing fiber intake will help.    For any questions of a medical nature, call your surgeon.    Do not make important decisions for 24 hours.    If you had general anesthesia, you may have a sore throat for a couple of days related to the breathing tube used during surgery.  You may use Cepacol lozenges to help with this discomfort.  If it worsens or if you develop a fever, contact your surgeon.     If you feel your pain is not well managed with the pain medications prescribed by your surgeon, please contact your surgeon's office to let them know so they can address your concerns.

## 2022-11-07 NOTE — ANESTHESIA CARE TRANSFER NOTE
Patient: Lorri Ansari    Procedure: Procedure(s):  LAPAROSCOPIC CHOLECYSTECTOMY       Diagnosis: Cholecystitis [K81.9]  Diagnosis Additional Information: No value filed.    Anesthesia Type:   General     Note:    Oropharynx: spontaneously breathing  Level of Consciousness: drowsy  Oxygen Supplementation: face mask  Level of Supplemental Oxygen (L/min / FiO2): 6  Independent Airway: airway patency satisfactory and stable  Dentition: dentition unchanged  Vital Signs Stable: post-procedure vital signs reviewed and stable  Report to RN Given: handoff report given  Patient transferred to: PACU  Comments: Neuromuscular blockade reversed after TOF 4/4, spontaneous respirations, oropharynx suctioned with soft flexible catheter, airway patent after extubation.  Oxygen via facemask at 6 liters per minute to PACU. Oxygen tubing connected to wall O2 in PACU, SpO2, NiBP, and EKG monitors and alarms on and functioning, report on patient's clinical status given to PACU RN, RN questions answered.     Handoff Report: Identifed the Patient, Identified the Reponsible Provider, Reviewed the pertinent medical history, Discussed the surgical course, Reviewed Intra-OP anesthesia mangement and issues during anesthesia, Set expectations for post-procedure period and Allowed opportunity for questions and acknowledgement of understanding      Vitals:  Vitals Value Taken Time   BP     Temp     Pulse     Resp     SpO2         Electronically Signed By: TALON Biswas CRNA  November 7, 2022  2:35 PM

## 2022-11-07 NOTE — ANESTHESIA POSTPROCEDURE EVALUATION
Patient: Lorri Ansari    Procedure: Procedure(s):  LAPAROSCOPIC CHOLECYSTECTOMY       Anesthesia Type:  General    Note:     Postop Pain Control: Uneventful            Sign Out: Well controlled pain   PONV: No   Neuro/Psych: Uneventful            Sign Out: Acceptable/Baseline neuro status   Airway/Respiratory: Uneventful            Sign Out: Acceptable/Baseline resp. status   CV/Hemodynamics: Uneventful            Sign Out: Acceptable CV status; No obvious hypovolemia; No obvious fluid overload   Other NRE: NONE   DID A NON-ROUTINE EVENT OCCUR? No           Last vitals:  Vitals Value Taken Time   /72 11/07/22 1530   Temp 36.9  C (98.4  F) 11/07/22 1530   Pulse 70 11/07/22 1535   Resp 16 11/07/22 1535   SpO2 99 % 11/07/22 1537   Vitals shown include unvalidated device data.    Electronically Signed By: Wilman Briseno MD  November 7, 2022  4:15 PM

## 2022-11-08 LAB
PATH REPORT.COMMENTS IMP SPEC: NORMAL
PATH REPORT.COMMENTS IMP SPEC: NORMAL
PATH REPORT.FINAL DX SPEC: NORMAL
PATH REPORT.GROSS SPEC: NORMAL
PATH REPORT.MICROSCOPIC SPEC OTHER STN: NORMAL
PATH REPORT.RELEVANT HX SPEC: NORMAL
PHOTO IMAGE: NORMAL

## 2022-11-08 NOTE — OP NOTE
Surgeon: Ok Latif MD  1st Assistant: Jazzy Enriquez PA-C, The physicians assistant was medically necessary for their expertise in camera management, suctioning, suturing, and retraction.  PREOPERATIVE DIAGNOSIS: chronic cholecystitis.   POSTOPERATIVE DIAGNOSES: Same  PROCEDURE: Laparoscopic cholecystectomy.   ANESTHESIA: General.   ESTIMATED BLOOD LOSS: Less than 5 mL.   OPERATIVE PROCEDURE: After induction of general endotracheal anesthesia, Lorri Ansari abdomen was prepped and draped in the usual sterile fashion. With the Miguel technique in an periumbilical location, the abdomen was entered and pneumoperitoneum was established. Three additional 5 mm trocars were placed along the right hypochondrium. The gallbladder fundus was retracted cephalad and lateral and the infundibulum was retracted caudad and lateral. The peritoneum investing the triangle of Calot was incised with electrocautery and dissected bluntly. The critical view of a single cystic duct, single cystic artery was achieved and both structures were triply and doubly clipped on the patient's side, singly clipped on the gallbladder side and transected sharply. The gallbladder was detached from the liver with electrocautery and blunt dissection. The specimen was removed from the patient's abdomen and sent to pathology. The gallbladder fossa was inspected. Hemostasis was secured, and no evidence of bile leakage noted. The abdomen was surveyed and no other pathology seen. The trocars were then removed under direct visualization without evidence of bleeding. Periumbilical port was closed with multiple interrupted 0 Vicryl suture. Skin was approximated with absorbable sutures. Steri-Strips and sterile dressing applied. No immediate complications.   OK LATIF MD

## 2022-11-22 NOTE — TELEPHONE ENCOUNTER
[facility]  Post op call note  November 22, 2022       Lorri Ansari was called for an update regarding her recovery.  There was no answer and a message was left encouraging her to call us back with any questions, concerns, or to provide an update.        Jazzy Enriquez PA-C  [facility]  [phone]

## 2023-02-12 ENCOUNTER — HEALTH MAINTENANCE LETTER (OUTPATIENT)
Age: 38
End: 2023-02-12

## 2023-10-06 ENCOUNTER — LAB REQUISITION (OUTPATIENT)
Dept: LAB | Facility: CLINIC | Age: 38
End: 2023-10-06
Payer: COMMERCIAL

## 2023-10-06 DIAGNOSIS — L08.9 LOCAL INFECTION OF THE SKIN AND SUBCUTANEOUS TISSUE, UNSPECIFIED: ICD-10-CM

## 2023-10-06 PROCEDURE — 87070 CULTURE OTHR SPECIMN AEROBIC: CPT | Mod: ORL | Performed by: DERMATOLOGY

## 2023-10-08 LAB — BACTERIA WND CULT: NORMAL

## 2024-03-16 ENCOUNTER — HEALTH MAINTENANCE LETTER (OUTPATIENT)
Age: 39
End: 2024-03-16

## 2025-03-22 ENCOUNTER — HEALTH MAINTENANCE LETTER (OUTPATIENT)
Age: 40
End: 2025-03-22

## (undated) DEVICE — LINEN TOWEL PACK X5 5464

## (undated) DEVICE — MANIFOLD NEPTUNE 4 PORT 700-20

## (undated) DEVICE — ESU GROUND PAD UNIVERSAL W/O CORD

## (undated) DEVICE — ENDO POUCH UNIV RETRIEVAL SYSTEM INZII 10MM CD001

## (undated) DEVICE — EVAC SYSTEM CLEAR FLOW SC082500

## (undated) DEVICE — GLOVE BIOGEL PI MICRO INDICATOR UNDERGLOVE SZ 7.5 48975

## (undated) DEVICE — SUCTION IRR STRYKERFLOW II W/TIP 250-070-520

## (undated) DEVICE — APPLIER ENDO CLIP APPLIED MED UNIVERSAL 5MMX34CM LF CA500

## (undated) DEVICE — PREP CHLORAPREP 26ML TINTED HI-LITE ORANGE 930815

## (undated) DEVICE — ENDO TROCAR BLUNT TIP KII BALLOON 12X100MM C0R47

## (undated) DEVICE — DECANTER VIAL 2006S

## (undated) DEVICE — SOL NACL 0.9% INJ 1000ML BAG 2B1324X

## (undated) DEVICE — GLOVE BIOGEL PI MICRO SZ 7.5 48575

## (undated) DEVICE — ESU HOLDER LAP INST DISP PURPLE LONG 330MM H-PRO-330

## (undated) DEVICE — ENDO TROCAR SLEEVE KII Z-THREADED 05X100MM CTS02

## (undated) DEVICE — SU VICRYL 0 UR-6 27" J603H

## (undated) DEVICE — ENDO SCOPE WARMER LF TM500

## (undated) DEVICE — PACK LAP CHOLE SLC15LCFSD

## (undated) DEVICE — ENDO TROCAR FIRST ENTRY KII FIOS Z-THRD 05X100MM CTF03

## (undated) DEVICE — SOL WATER IRRIG 1000ML BOTTLE 2F7114

## (undated) DEVICE — SU MONOCRYL 4-0 PS-2 18" UND Y496G

## (undated) RX ORDER — ACETAMINOPHEN 325 MG/1
TABLET ORAL
Status: DISPENSED
Start: 2022-11-07

## (undated) RX ORDER — PROPOFOL 10 MG/ML
INJECTION, EMULSION INTRAVENOUS
Status: DISPENSED
Start: 2022-11-07

## (undated) RX ORDER — FENTANYL CITRATE 0.05 MG/ML
INJECTION, SOLUTION INTRAMUSCULAR; INTRAVENOUS
Status: DISPENSED
Start: 2022-11-07

## (undated) RX ORDER — KETOROLAC TROMETHAMINE 30 MG/ML
INJECTION, SOLUTION INTRAMUSCULAR; INTRAVENOUS
Status: DISPENSED
Start: 2022-11-07

## (undated) RX ORDER — BUPIVACAINE HYDROCHLORIDE AND EPINEPHRINE 5; 5 MG/ML; UG/ML
INJECTION, SOLUTION EPIDURAL; INTRACAUDAL; PERINEURAL
Status: DISPENSED
Start: 2022-11-07

## (undated) RX ORDER — FENTANYL CITRATE 50 UG/ML
INJECTION, SOLUTION INTRAMUSCULAR; INTRAVENOUS
Status: DISPENSED
Start: 2022-11-07

## (undated) RX ORDER — NEOSTIGMINE METHYLSULFATE 1 MG/ML
VIAL (ML) INJECTION
Status: DISPENSED
Start: 2022-11-07

## (undated) RX ORDER — ONDANSETRON 2 MG/ML
INJECTION INTRAMUSCULAR; INTRAVENOUS
Status: DISPENSED
Start: 2022-11-07